# Patient Record
Sex: FEMALE | Race: OTHER | HISPANIC OR LATINO | URBAN - METROPOLITAN AREA
[De-identification: names, ages, dates, MRNs, and addresses within clinical notes are randomized per-mention and may not be internally consistent; named-entity substitution may affect disease eponyms.]

---

## 2017-01-26 ENCOUNTER — OUTPATIENT (OUTPATIENT)
Dept: OUTPATIENT SERVICES | Facility: HOSPITAL | Age: 22
LOS: 1 days | End: 2017-01-26
Payer: MEDICAID

## 2017-01-26 DIAGNOSIS — O47.02 FALSE LABOR BEFORE 37 COMPLETED WEEKS OF GESTATION, SECOND TRIMESTER: ICD-10-CM

## 2017-01-26 LAB
ALBUMIN SERPL ELPH-MCNC: 3.8 G/DL — SIGNIFICANT CHANGE UP (ref 3.3–5.2)
ALP SERPL-CCNC: 82 U/L — SIGNIFICANT CHANGE UP (ref 40–120)
ALT FLD-CCNC: 16 U/L — SIGNIFICANT CHANGE UP
ANION GAP SERPL CALC-SCNC: 15 MMOL/L — SIGNIFICANT CHANGE UP (ref 5–17)
ANISOCYTOSIS BLD QL: SLIGHT — SIGNIFICANT CHANGE UP
APPEARANCE UR: CLEAR — SIGNIFICANT CHANGE UP
AST SERPL-CCNC: 15 U/L — SIGNIFICANT CHANGE UP
BASOPHILS # BLD AUTO: 0 K/UL — SIGNIFICANT CHANGE UP (ref 0–0.2)
BILIRUB SERPL-MCNC: 0.1 MG/DL — LOW (ref 0.4–2)
BILIRUB UR-MCNC: NEGATIVE — SIGNIFICANT CHANGE UP
BUN SERPL-MCNC: 8 MG/DL — SIGNIFICANT CHANGE UP (ref 8–20)
CALCIUM SERPL-MCNC: 9.3 MG/DL — SIGNIFICANT CHANGE UP (ref 8.6–10.2)
CHLORIDE SERPL-SCNC: 99 MMOL/L — SIGNIFICANT CHANGE UP (ref 98–107)
CO2 SERPL-SCNC: 22 MMOL/L — SIGNIFICANT CHANGE UP (ref 22–29)
COLOR SPEC: YELLOW — SIGNIFICANT CHANGE UP
CREAT SERPL-MCNC: 0.42 MG/DL — LOW (ref 0.5–1.3)
DIFF PNL FLD: NEGATIVE — SIGNIFICANT CHANGE UP
EOSINOPHIL # BLD AUTO: 0.1 K/UL — SIGNIFICANT CHANGE UP (ref 0–0.5)
EOSINOPHIL NFR BLD AUTO: 1 % — SIGNIFICANT CHANGE UP (ref 0–5)
EPI CELLS # UR: SIGNIFICANT CHANGE UP
GLUCOSE SERPL-MCNC: 88 MG/DL — SIGNIFICANT CHANGE UP (ref 70–115)
GLUCOSE UR QL: NEGATIVE MG/DL — SIGNIFICANT CHANGE UP
HCT VFR BLD CALC: 29 % — LOW (ref 37–47)
HGB BLD-MCNC: 9.7 G/DL — LOW (ref 12–16)
HYPOCHROMIA BLD QL: SLIGHT — SIGNIFICANT CHANGE UP
KETONES UR-MCNC: NEGATIVE — SIGNIFICANT CHANGE UP
LEUKOCYTE ESTERASE UR-ACNC: NEGATIVE — SIGNIFICANT CHANGE UP
LYMPHOCYTES # BLD AUTO: 1.3 K/UL — SIGNIFICANT CHANGE UP (ref 1–4.8)
LYMPHOCYTES # BLD AUTO: 13 % — LOW (ref 20–55)
MCHC RBC-ENTMCNC: 28.4 PG — SIGNIFICANT CHANGE UP (ref 27–31)
MCHC RBC-ENTMCNC: 33.4 G/DL — SIGNIFICANT CHANGE UP (ref 32–36)
MCV RBC AUTO: 84.8 FL — SIGNIFICANT CHANGE UP (ref 81–99)
MONOCYTES # BLD AUTO: 0.8 K/UL — SIGNIFICANT CHANGE UP (ref 0–0.8)
MONOCYTES NFR BLD AUTO: 7 % — SIGNIFICANT CHANGE UP (ref 3–10)
MYELOCYTES NFR BLD: 1 % — HIGH (ref 0–0)
NEUTROPHILS # BLD AUTO: 7.2 K/UL — SIGNIFICANT CHANGE UP (ref 1.8–8)
NEUTROPHILS NFR BLD AUTO: 75 % — HIGH (ref 37–73)
NEUTS BAND # BLD: 3 % — SIGNIFICANT CHANGE UP (ref 0–8)
NITRITE UR-MCNC: NEGATIVE — SIGNIFICANT CHANGE UP
PH UR: 7 — SIGNIFICANT CHANGE UP (ref 4.8–8)
PLAT MORPH BLD: NORMAL — SIGNIFICANT CHANGE UP
PLATELET # BLD AUTO: 291 K/UL — SIGNIFICANT CHANGE UP (ref 150–400)
POIKILOCYTOSIS BLD QL AUTO: SLIGHT — SIGNIFICANT CHANGE UP
POTASSIUM SERPL-MCNC: 3.4 MMOL/L — LOW (ref 3.5–5.3)
POTASSIUM SERPL-SCNC: 3.4 MMOL/L — LOW (ref 3.5–5.3)
PROT SERPL-MCNC: 7.5 G/DL — SIGNIFICANT CHANGE UP (ref 6.6–8.7)
PROT UR-MCNC: NEGATIVE MG/DL — SIGNIFICANT CHANGE UP
RBC # BLD: 3.42 M/UL — LOW (ref 4.4–5.2)
RBC # FLD: 12.6 % — SIGNIFICANT CHANGE UP (ref 11–15.6)
RBC BLD AUTO: ABNORMAL
SODIUM SERPL-SCNC: 136 MMOL/L — SIGNIFICANT CHANGE UP (ref 135–145)
SP GR SPEC: 1.01 — SIGNIFICANT CHANGE UP (ref 1.01–1.02)
UROBILINOGEN FLD QL: NEGATIVE MG/DL — SIGNIFICANT CHANGE UP
WBC # BLD: 10.08 K/UL — SIGNIFICANT CHANGE UP (ref 4.8–10.8)
WBC # FLD AUTO: 10.08 K/UL — SIGNIFICANT CHANGE UP (ref 4.8–10.8)

## 2017-01-26 PROCEDURE — 85027 COMPLETE CBC AUTOMATED: CPT

## 2017-01-26 PROCEDURE — 59050 FETAL MONITOR W/REPORT: CPT

## 2017-01-26 PROCEDURE — 80053 COMPREHEN METABOLIC PANEL: CPT

## 2017-01-26 PROCEDURE — T1013: CPT

## 2017-01-26 PROCEDURE — 81001 URINALYSIS AUTO W/SCOPE: CPT

## 2017-01-26 RX ORDER — ASCORBIC ACID 60 MG
1 TABLET,CHEWABLE ORAL
Qty: 30 | Refills: 2 | OUTPATIENT
Start: 2017-01-26 | End: 2017-04-25

## 2017-01-26 RX ORDER — SODIUM CHLORIDE 9 MG/ML
1000 INJECTION, SOLUTION INTRAVENOUS ONCE
Qty: 0 | Refills: 0 | Status: DISCONTINUED | OUTPATIENT
Start: 2017-01-26 | End: 2017-02-10

## 2017-01-26 RX ORDER — FERROUS SULFATE 325(65) MG
1 TABLET ORAL
Qty: 60 | Refills: 2 | OUTPATIENT
Start: 2017-01-26 | End: 2017-04-25

## 2017-05-19 ENCOUNTER — OUTPATIENT (OUTPATIENT)
Dept: OUTPATIENT SERVICES | Facility: HOSPITAL | Age: 22
LOS: 1 days | End: 2017-05-19
Payer: COMMERCIAL

## 2017-05-19 DIAGNOSIS — O47.1 FALSE LABOR AT OR AFTER 37 COMPLETED WEEKS OF GESTATION: ICD-10-CM

## 2017-05-19 LAB
APPEARANCE UR: CLEAR — SIGNIFICANT CHANGE UP
BILIRUB UR-MCNC: NEGATIVE — SIGNIFICANT CHANGE UP
COLOR SPEC: YELLOW — SIGNIFICANT CHANGE UP
DIFF PNL FLD: ABNORMAL
EPI CELLS # UR: SIGNIFICANT CHANGE UP
GLUCOSE UR QL: NEGATIVE MG/DL — SIGNIFICANT CHANGE UP
KETONES UR-MCNC: NEGATIVE — SIGNIFICANT CHANGE UP
LEUKOCYTE ESTERASE UR-ACNC: ABNORMAL
NITRITE UR-MCNC: NEGATIVE — SIGNIFICANT CHANGE UP
PH UR: 7 — SIGNIFICANT CHANGE UP (ref 5–8)
PROT UR-MCNC: 100 MG/DL
RBC CASTS # UR COMP ASSIST: SIGNIFICANT CHANGE UP /HPF (ref 0–4)
SP GR SPEC: 1 — LOW (ref 1.01–1.02)
UROBILINOGEN FLD QL: NEGATIVE MG/DL — SIGNIFICANT CHANGE UP
WBC UR QL: ABNORMAL

## 2017-05-19 PROCEDURE — G0463: CPT

## 2017-05-19 PROCEDURE — 81001 URINALYSIS AUTO W/SCOPE: CPT

## 2017-05-19 PROCEDURE — 59025 FETAL NON-STRESS TEST: CPT

## 2017-05-19 PROCEDURE — 87086 URINE CULTURE/COLONY COUNT: CPT

## 2017-05-19 RX ORDER — CEPHALEXIN 500 MG
1 CAPSULE ORAL
Qty: 14 | Refills: 0 | OUTPATIENT
Start: 2017-05-19 | End: 2017-05-26

## 2017-05-20 LAB
CULTURE RESULTS: SIGNIFICANT CHANGE UP
SPECIMEN SOURCE: SIGNIFICANT CHANGE UP

## 2017-06-03 ENCOUNTER — INPATIENT (INPATIENT)
Facility: HOSPITAL | Age: 22
LOS: 2 days | Discharge: HOME | End: 2017-06-06
Attending: OBSTETRICS & GYNECOLOGY | Admitting: OBSTETRICS & GYNECOLOGY
Payer: MEDICAID

## 2017-06-03 VITALS — WEIGHT: 169.76 LBS | HEIGHT: 60 IN

## 2017-06-03 DIAGNOSIS — O47.1 FALSE LABOR AT OR AFTER 37 COMPLETED WEEKS OF GESTATION: ICD-10-CM

## 2017-06-03 DIAGNOSIS — O26.893 OTHER SPECIFIED PREGNANCY RELATED CONDITIONS, THIRD TRIMESTER: ICD-10-CM

## 2017-06-03 RX ORDER — SODIUM CHLORIDE 9 MG/ML
1000 INJECTION, SOLUTION INTRAVENOUS ONCE
Qty: 0 | Refills: 0 | Status: DISCONTINUED | OUTPATIENT
Start: 2017-06-03 | End: 2017-06-03

## 2017-06-03 RX ORDER — CITRIC ACID/SODIUM CITRATE 300-500 MG
30 SOLUTION, ORAL ORAL ONCE
Qty: 0 | Refills: 0 | Status: DISCONTINUED | OUTPATIENT
Start: 2017-06-03 | End: 2017-06-03

## 2017-06-03 RX ORDER — OXYTOCIN 10 UNIT/ML
333.33 VIAL (ML) INJECTION
Qty: 20 | Refills: 0 | Status: DISCONTINUED | OUTPATIENT
Start: 2017-06-03 | End: 2017-06-03

## 2017-06-03 RX ORDER — SODIUM CHLORIDE 9 MG/ML
1000 INJECTION, SOLUTION INTRAVENOUS
Qty: 0 | Refills: 0 | Status: DISCONTINUED | OUTPATIENT
Start: 2017-06-03 | End: 2017-06-03

## 2017-06-04 LAB
ANISOCYTOSIS BLD QL: SLIGHT — SIGNIFICANT CHANGE UP
ANISOCYTOSIS BLD QL: SLIGHT — SIGNIFICANT CHANGE UP
APPEARANCE UR: CLEAR — SIGNIFICANT CHANGE UP
BACTERIA # UR AUTO: ABNORMAL
BASE EXCESS BLDCOA CALC-SCNC: -6.6 MMOL/L — SIGNIFICANT CHANGE UP (ref -11.6–0.4)
BASE EXCESS BLDCOV CALC-SCNC: -6.1 MMOL/L — SIGNIFICANT CHANGE UP (ref -9.3–0.3)
BASOPHILS # BLD AUTO: 0 K/UL — SIGNIFICANT CHANGE UP (ref 0–0.2)
BASOPHILS NFR BLD AUTO: 0.2 % — SIGNIFICANT CHANGE UP (ref 0–2)
BASOPHILS NFR BLD AUTO: 2 % — SIGNIFICANT CHANGE UP (ref 0–2)
BILIRUB UR-MCNC: NEGATIVE — SIGNIFICANT CHANGE UP
BLD GP AB SCN SERPL QL: SIGNIFICANT CHANGE UP
COLOR SPEC: SIGNIFICANT CHANGE UP
DIFF PNL FLD: NEGATIVE — SIGNIFICANT CHANGE UP
EOSINOPHIL # BLD AUTO: 0 K/UL — SIGNIFICANT CHANGE UP (ref 0–0.5)
EOSINOPHIL NFR BLD AUTO: 0.1 % — SIGNIFICANT CHANGE UP (ref 0–6)
EOSINOPHIL NFR BLD AUTO: 3 % — SIGNIFICANT CHANGE UP (ref 0–5)
EPI CELLS # UR: ABNORMAL
GAS PNL BLDCOV: 7.23 — SIGNIFICANT CHANGE UP (ref 7.11–7.36)
GLUCOSE UR QL: NEGATIVE MG/DL — SIGNIFICANT CHANGE UP
HCO3 BLDCOA-SCNC: 22 MMOL/L — SIGNIFICANT CHANGE UP (ref 15–27)
HCO3 BLDCOV-SCNC: 22 MMOL/L — SIGNIFICANT CHANGE UP (ref 17–25)
HCT VFR BLD CALC: 22.2 % — LOW (ref 37–47)
HCT VFR BLD CALC: 26.5 % — LOW (ref 37–47)
HGB BLD-MCNC: 6.8 G/DL — CRITICAL LOW (ref 12–16)
HGB BLD-MCNC: 8.2 G/DL — LOW (ref 12–16)
HYPOCHROMIA BLD QL: SIGNIFICANT CHANGE UP
HYPOCHROMIA BLD QL: SIGNIFICANT CHANGE UP
KETONES UR-MCNC: NEGATIVE — SIGNIFICANT CHANGE UP
LEUKOCYTE ESTERASE UR-ACNC: ABNORMAL
LYMPHOCYTES # BLD AUTO: 1.4 K/UL — SIGNIFICANT CHANGE UP (ref 1–4.8)
LYMPHOCYTES # BLD AUTO: 12.4 % — LOW (ref 20–55)
LYMPHOCYTES # BLD AUTO: 30 % — SIGNIFICANT CHANGE UP (ref 20–55)
MCHC RBC-ENTMCNC: 21.4 PG — LOW (ref 27–31)
MCHC RBC-ENTMCNC: 22.6 PG — LOW (ref 27–31)
MCHC RBC-ENTMCNC: 30.6 G/DL — LOW (ref 32–36)
MCHC RBC-ENTMCNC: 30.9 G/DL — LOW (ref 32–36)
MCV RBC AUTO: 69.8 FL — LOW (ref 81–99)
MCV RBC AUTO: 73 FL — LOW (ref 81–99)
METAMYELOCYTES # FLD: 1 % — HIGH (ref 0–0)
MICROCYTES BLD QL: SLIGHT — SIGNIFICANT CHANGE UP
MONOCYTES # BLD AUTO: 0.8 K/UL — SIGNIFICANT CHANGE UP (ref 0–0.8)
MONOCYTES NFR BLD AUTO: 3 % — SIGNIFICANT CHANGE UP (ref 3–10)
MONOCYTES NFR BLD AUTO: 7.6 % — SIGNIFICANT CHANGE UP (ref 3–10)
NEUTROPHILS # BLD AUTO: 8.8 K/UL — HIGH (ref 1.8–8)
NEUTROPHILS NFR BLD AUTO: 61 % — SIGNIFICANT CHANGE UP (ref 37–73)
NEUTROPHILS NFR BLD AUTO: 79.3 % — HIGH (ref 37–73)
NITRITE UR-MCNC: NEGATIVE — SIGNIFICANT CHANGE UP
OVALOCYTES BLD QL SMEAR: SLIGHT — SIGNIFICANT CHANGE UP
OVALOCYTES BLD QL SMEAR: SLIGHT — SIGNIFICANT CHANGE UP
PCO2 BLDCOA: 52.8 MMHG — SIGNIFICANT CHANGE UP (ref 32.2–65.8)
PCO2 BLDCOV: 52 MMHG — HIGH (ref 27–49.4)
PH BLDCOA: 7.22 — SIGNIFICANT CHANGE UP (ref 7.11–7.36)
PH UR: 7 — SIGNIFICANT CHANGE UP (ref 5–8)
PLAT MORPH BLD: NORMAL — SIGNIFICANT CHANGE UP
PLAT MORPH BLD: NORMAL — SIGNIFICANT CHANGE UP
PLATELET # BLD AUTO: 254 K/UL — SIGNIFICANT CHANGE UP (ref 150–400)
PLATELET # BLD AUTO: 267 K/UL — SIGNIFICANT CHANGE UP (ref 150–400)
PO2 BLDCOA: 22.6 MMHG — SIGNIFICANT CHANGE UP (ref 6–30)
PO2 BLDCOA: 27.2 MMHG — SIGNIFICANT CHANGE UP (ref 17.4–41)
POIKILOCYTOSIS BLD QL AUTO: SLIGHT — SIGNIFICANT CHANGE UP
POIKILOCYTOSIS BLD QL AUTO: SLIGHT — SIGNIFICANT CHANGE UP
PROT UR-MCNC: NEGATIVE MG/DL — SIGNIFICANT CHANGE UP
RBC # BLD: 3.18 M/UL — LOW (ref 4.4–5.2)
RBC # BLD: 3.63 M/UL — LOW (ref 4.4–5.2)
RBC # FLD: 16.5 % — HIGH (ref 11–15.6)
RBC # FLD: 17.5 % — HIGH (ref 11–15.6)
RBC BLD AUTO: ABNORMAL
RBC BLD AUTO: ABNORMAL
RBC CASTS # UR COMP ASSIST: NEGATIVE /HPF — SIGNIFICANT CHANGE UP (ref 0–4)
RPR SERPL-ACNC: SIGNIFICANT CHANGE UP
SAO2 % BLDCOA: SIGNIFICANT CHANGE UP
SAO2 % BLDCOV: SIGNIFICANT CHANGE UP
SP GR SPEC: 1 — LOW (ref 1.01–1.02)
STOMATOCYTES BLD QL SMEAR: PRESENT — SIGNIFICANT CHANGE UP
TARGETS BLD QL SMEAR: SLIGHT — SIGNIFICANT CHANGE UP
TYPE + AB SCN PNL BLD: SIGNIFICANT CHANGE UP
UROBILINOGEN FLD QL: NEGATIVE MG/DL — SIGNIFICANT CHANGE UP
WBC # BLD: 11.1 K/UL — HIGH (ref 4.8–10.8)
WBC # BLD: 7.1 K/UL — SIGNIFICANT CHANGE UP (ref 4.8–10.8)
WBC # FLD AUTO: 11.1 K/UL — HIGH (ref 4.8–10.8)
WBC # FLD AUTO: 7.1 K/UL — SIGNIFICANT CHANGE UP (ref 4.8–10.8)
WBC UR QL: SIGNIFICANT CHANGE UP

## 2017-06-04 RX ORDER — SODIUM CHLORIDE 9 MG/ML
1000 INJECTION, SOLUTION INTRAVENOUS ONCE
Qty: 0 | Refills: 0 | Status: COMPLETED | OUTPATIENT
Start: 2017-06-04 | End: 2017-06-04

## 2017-06-04 RX ORDER — ACETAMINOPHEN 500 MG
650 TABLET ORAL EVERY 6 HOURS
Qty: 0 | Refills: 0 | Status: DISCONTINUED | OUTPATIENT
Start: 2017-06-04 | End: 2017-06-06

## 2017-06-04 RX ORDER — SODIUM CHLORIDE 9 MG/ML
1000 INJECTION, SOLUTION INTRAVENOUS
Qty: 0 | Refills: 0 | Status: DISCONTINUED | OUTPATIENT
Start: 2017-06-04 | End: 2017-06-04

## 2017-06-04 RX ORDER — IBUPROFEN 200 MG
600 TABLET ORAL EVERY 6 HOURS
Qty: 0 | Refills: 0 | Status: DISCONTINUED | OUTPATIENT
Start: 2017-06-04 | End: 2017-06-06

## 2017-06-04 RX ORDER — SODIUM CHLORIDE 9 MG/ML
3 INJECTION INTRAMUSCULAR; INTRAVENOUS; SUBCUTANEOUS EVERY 8 HOURS
Qty: 0 | Refills: 0 | Status: DISCONTINUED | OUTPATIENT
Start: 2017-06-04 | End: 2017-06-06

## 2017-06-04 RX ORDER — TRANEXAMIC ACID 100 MG/ML
1000 INJECTION, SOLUTION INTRAVENOUS ONCE
Qty: 0 | Refills: 0 | Status: DISCONTINUED | OUTPATIENT
Start: 2017-06-04 | End: 2017-06-06

## 2017-06-04 RX ORDER — OXYTOCIN 10 UNIT/ML
333.33 VIAL (ML) INJECTION
Qty: 20 | Refills: 0 | Status: DISCONTINUED | OUTPATIENT
Start: 2017-06-04 | End: 2017-06-04

## 2017-06-04 RX ORDER — GLYCERIN ADULT
1 SUPPOSITORY, RECTAL RECTAL AT BEDTIME
Qty: 0 | Refills: 0 | Status: DISCONTINUED | OUTPATIENT
Start: 2017-06-04 | End: 2017-06-06

## 2017-06-04 RX ORDER — DIBUCAINE 1 %
1 OINTMENT (GRAM) RECTAL EVERY 4 HOURS
Qty: 0 | Refills: 0 | Status: DISCONTINUED | OUTPATIENT
Start: 2017-06-04 | End: 2017-06-06

## 2017-06-04 RX ORDER — HYDROCORTISONE 1 %
1 OINTMENT (GRAM) TOPICAL EVERY 4 HOURS
Qty: 0 | Refills: 0 | Status: DISCONTINUED | OUTPATIENT
Start: 2017-06-04 | End: 2017-06-06

## 2017-06-04 RX ORDER — SODIUM CHLORIDE 9 MG/ML
1000 INJECTION, SOLUTION INTRAVENOUS ONCE
Qty: 0 | Refills: 0 | Status: DISCONTINUED | OUTPATIENT
Start: 2017-06-04 | End: 2017-06-04

## 2017-06-04 RX ORDER — CITRIC ACID/SODIUM CITRATE 300-500 MG
30 SOLUTION, ORAL ORAL ONCE
Qty: 0 | Refills: 0 | Status: DISCONTINUED | OUTPATIENT
Start: 2017-06-04 | End: 2017-06-04

## 2017-06-04 RX ORDER — LANOLIN
1 OINTMENT (GRAM) TOPICAL EVERY 6 HOURS
Qty: 0 | Refills: 0 | Status: DISCONTINUED | OUTPATIENT
Start: 2017-06-04 | End: 2017-06-06

## 2017-06-04 RX ORDER — AER TRAVELER 0.5 G/1
1 SOLUTION RECTAL; TOPICAL EVERY 4 HOURS
Qty: 0 | Refills: 0 | Status: DISCONTINUED | OUTPATIENT
Start: 2017-06-04 | End: 2017-06-06

## 2017-06-04 RX ORDER — CITRIC ACID/SODIUM CITRATE 300-500 MG
30 SOLUTION, ORAL ORAL ONCE
Qty: 0 | Refills: 0 | Status: COMPLETED | OUTPATIENT
Start: 2017-06-04 | End: 2017-06-04

## 2017-06-04 RX ORDER — OXYTOCIN 10 UNIT/ML
41.67 VIAL (ML) INJECTION
Qty: 20 | Refills: 0 | Status: DISCONTINUED | OUTPATIENT
Start: 2017-06-04 | End: 2017-06-06

## 2017-06-04 RX ORDER — PRAMOXINE HYDROCHLORIDE 150 MG/15G
1 AEROSOL, FOAM RECTAL EVERY 4 HOURS
Qty: 0 | Refills: 0 | Status: DISCONTINUED | OUTPATIENT
Start: 2017-06-04 | End: 2017-06-06

## 2017-06-04 RX ORDER — SIMETHICONE 80 MG/1
80 TABLET, CHEWABLE ORAL EVERY 6 HOURS
Qty: 0 | Refills: 0 | Status: DISCONTINUED | OUTPATIENT
Start: 2017-06-04 | End: 2017-06-06

## 2017-06-04 RX ORDER — MAGNESIUM HYDROXIDE 400 MG/1
30 TABLET, CHEWABLE ORAL
Qty: 0 | Refills: 0 | Status: DISCONTINUED | OUTPATIENT
Start: 2017-06-04 | End: 2017-06-06

## 2017-06-04 RX ORDER — TETANUS TOXOID, REDUCED DIPHTHERIA TOXOID AND ACELLULAR PERTUSSIS VACCINE, ADSORBED 5; 2.5; 8; 8; 2.5 [IU]/.5ML; [IU]/.5ML; UG/.5ML; UG/.5ML; UG/.5ML
0.5 SUSPENSION INTRAMUSCULAR ONCE
Qty: 0 | Refills: 0 | Status: DISCONTINUED | OUTPATIENT
Start: 2017-06-04 | End: 2017-06-06

## 2017-06-04 RX ORDER — DIPHENHYDRAMINE HCL 50 MG
25 CAPSULE ORAL EVERY 6 HOURS
Qty: 0 | Refills: 0 | Status: DISCONTINUED | OUTPATIENT
Start: 2017-06-04 | End: 2017-06-06

## 2017-06-04 RX ORDER — DOCUSATE SODIUM 100 MG
100 CAPSULE ORAL
Qty: 0 | Refills: 0 | Status: DISCONTINUED | OUTPATIENT
Start: 2017-06-04 | End: 2017-06-06

## 2017-06-04 RX ADMIN — Medication 30 MILLILITER(S): at 07:00

## 2017-06-04 RX ADMIN — SODIUM CHLORIDE 3 MILLILITER(S): 9 INJECTION INTRAMUSCULAR; INTRAVENOUS; SUBCUTANEOUS at 21:47

## 2017-06-04 RX ADMIN — SODIUM CHLORIDE 125 MILLILITER(S): 9 INJECTION, SOLUTION INTRAVENOUS at 08:09

## 2017-06-04 RX ADMIN — Medication 600 MILLIGRAM(S): at 21:48

## 2017-06-04 RX ADMIN — SODIUM CHLORIDE 2000 MILLILITER(S): 9 INJECTION, SOLUTION INTRAVENOUS at 06:55

## 2017-06-04 RX ADMIN — Medication 600 MILLIGRAM(S): at 22:30

## 2017-06-04 RX ADMIN — SODIUM CHLORIDE 125 MILLILITER(S): 9 INJECTION, SOLUTION INTRAVENOUS at 02:33

## 2017-06-04 RX ADMIN — SODIUM CHLORIDE 125 MILLILITER(S): 9 INJECTION, SOLUTION INTRAVENOUS at 00:20

## 2017-06-05 DIAGNOSIS — O99.019 ANEMIA COMPLICATING PREGNANCY, UNSPECIFIED TRIMESTER: ICD-10-CM

## 2017-06-05 RX ADMIN — Medication 600 MILLIGRAM(S): at 23:45

## 2017-06-05 RX ADMIN — Medication 1 TABLET(S): at 10:41

## 2017-06-05 RX ADMIN — Medication 600 MILLIGRAM(S): at 22:47

## 2017-06-05 RX ADMIN — SODIUM CHLORIDE 3 MILLILITER(S): 9 INJECTION INTRAMUSCULAR; INTRAVENOUS; SUBCUTANEOUS at 07:05

## 2017-06-05 NOTE — PROGRESS NOTE ADULT - SUBJECTIVE AND OBJECTIVE BOX
22 yo  s/p  PPD#1. Pt seen and examined at bedside. Patient is ambulating, tolerating PO, denies n/v, dysuria, passing flatus, - BM, pain is well controlled on medication.     Vital Signs Last 24 Hrs  T(C): 37.1, Max: 37.2 (06-04 @ 13:30)  T(F): 98.7, Max: 98.9 (06- @ 13:30)  HR: 89 (77 - 93)  BP: 100/66 (100/66 - 138/61)  BP(mean): --  RR: 18 (16 - 18)  SpO2: 100% (99% - 100%) RA    General: NAD  HEENT: NCAT  Respiratory: CTABL  CV: S1S2 with RRR  Abdominal: Soft, +BS x 4 quadrants, uterine fundus firm level of umbilicus   Pelvic:  minimal lochia  Extremities: No edema b/l; no calf tenderness                              8.2    11.1  )-----------( 267      ( 2017 17:46 )             26.5   MEDICATIONS  (STANDING):  misoprostol Oral Solution 60MICROGram(s) Oral every 2 hours  misoprostol Oral Solution 40MICROGram(s) Oral every 2 hours  tranexamic acid IVPB 1000milliGRAM(s) IV Intermittent once  sodium chloride 0.9% lock flush 3milliLiter(s) IV Push every 8 hours  diphtheria/tetanus/pertussis (acellular) Vaccine (ADAcel) 0.5milliLiter(s) IntraMuscular once  oxytocin Infusion 41.667milliUNIT(s)/Min IV Continuous <Continuous>  prenatal multivitamin 1Tablet(s) Oral daily    MEDICATIONS  (PRN):  acetaminophen   Tablet. 650milliGRAM(s) Oral every 6 hours PRN Mild Pain  acetaminophen   Tablet 650milliGRAM(s) Oral every 6 hours PRN For Temp greater than 38.5 C (101.3 F)  ibuprofen  Tablet 600milliGRAM(s) Oral every 6 hours PRN Moderate Pain  hydrocortisone 1% Cream 1Application(s) Topical every 4 hours PRN Moderate to Severe Perineal Pain  pramoxine 1%/zinc 5% Cream 1Application(s) Topical every 4 hours PRN Moderate to Severe Perineal Pain  dibucaine 1% Ointment 1Application(s) Topical every 4 hours PRN Perineal Discomfort  lanolin Ointment 1Application(s) Topical every 6 hours PRN Sore Nipples  witch hazel Pads 1Application(s) Topical every 4 hours PRN Perineal Discomfort  simethicone 80milliGRAM(s) Chew every 6 hours PRN Gas  diphenhydrAMINE   Capsule 25milliGRAM(s) Oral every 6 hours PRN Itching  glycerin Suppository - Adult 1Suppository(s) Rectal at bedtime PRN Constipation  magnesium hydroxide Suspension 30milliLiter(s) Oral two times a day PRN Constipation  docusate sodium 100milliGRAM(s) Oral two times a day PRN Stool Softening 22 yo  s/p  PPD#1. Pt seen and examined at bedside. Patient is ambulating, tolerating PO, denies n/v, dysuria, passing flatus, - BM, pain is well controlled on medication. Pt is bottle-feeding.     Vital Signs Last 24 Hrs  T(C): 37.1, Max: 37.2 (06-04 @ 13:30)  T(F): 98.7, Max: 98.9 (06-04 @ 13:30)  HR: 89 (77 - 93)  BP: 100/66 (100/66 - 138/61)  BP(mean): --  RR: 18 (16 - 18)  SpO2: 100% (99% - 100%) RA    General: NAD  HEENT: NCAT  Respiratory: CTABL  CV: S1S2 with RRR  Abdominal: Soft, +BS x 4 quadrants, uterine fundus firm level of umbilicus   Pelvic:  minimal lochia  Extremities: No edema b/l; no calf tenderness                              8.2    11.1  )-----------( 267      ( 2017 17:46 )             26.5   MEDICATIONS  (STANDING):  misoprostol Oral Solution 60MICROGram(s) Oral every 2 hours  misoprostol Oral Solution 40MICROGram(s) Oral every 2 hours  tranexamic acid IVPB 1000milliGRAM(s) IV Intermittent once  sodium chloride 0.9% lock flush 3milliLiter(s) IV Push every 8 hours  diphtheria/tetanus/pertussis (acellular) Vaccine (ADAcel) 0.5milliLiter(s) IntraMuscular once  oxytocin Infusion 41.667milliUNIT(s)/Min IV Continuous <Continuous>  prenatal multivitamin 1Tablet(s) Oral daily    MEDICATIONS  (PRN):  acetaminophen   Tablet. 650milliGRAM(s) Oral every 6 hours PRN Mild Pain  acetaminophen   Tablet 650milliGRAM(s) Oral every 6 hours PRN For Temp greater than 38.5 C (101.3 F)  ibuprofen  Tablet 600milliGRAM(s) Oral every 6 hours PRN Moderate Pain  hydrocortisone 1% Cream 1Application(s) Topical every 4 hours PRN Moderate to Severe Perineal Pain  pramoxine 1%/zinc 5% Cream 1Application(s) Topical every 4 hours PRN Moderate to Severe Perineal Pain  dibucaine 1% Ointment 1Application(s) Topical every 4 hours PRN Perineal Discomfort  lanolin Ointment 1Application(s) Topical every 6 hours PRN Sore Nipples  witch hazel Pads 1Application(s) Topical every 4 hours PRN Perineal Discomfort  simethicone 80milliGRAM(s) Chew every 6 hours PRN Gas  diphenhydrAMINE   Capsule 25milliGRAM(s) Oral every 6 hours PRN Itching  glycerin Suppository - Adult 1Suppository(s) Rectal at bedtime PRN Constipation  magnesium hydroxide Suspension 30milliLiter(s) Oral two times a day PRN Constipation  docusate sodium 100milliGRAM(s) Oral two times a day PRN Stool Softening 20 yo  s/p  @ 39 .0  PPD#1. Pt seen and examined at bedside. Patient is ambulating, tolerating PO, denies n/v, dysuria, passing flatus, - BM, pain is well controlled on medication. Pt is bottle-feeding.     Vital Signs Last 24 Hrs  T(C): 37.1, Max: 37.2 (06-04 @ 13:30)  T(F): 98.7, Max: 98.9 (06-04 @ 13:30)  HR: 89 (77 - 93)  BP: 100/66 (100/66 - 138/61)  BP(mean): --  RR: 18 (16 - 18)  SpO2: 100% (99% - 100%) RA    General: NAD  HEENT: NCAT  Respiratory: CTABL  CV: S1S2 with RRR  Abdominal: Soft, +BS x 4 quadrants, uterine fundus firm level of umbilicus   Pelvic:  minimal lochia  Extremities: No edema b/l; no calf tenderness                              8.2    11.1  )-----------( 267      ( 2017 17:46 )             26.5   MEDICATIONS  (STANDING):  misoprostol Oral Solution 60MICROGram(s) Oral every 2 hours  misoprostol Oral Solution 40MICROGram(s) Oral every 2 hours  tranexamic acid IVPB 1000milliGRAM(s) IV Intermittent once  sodium chloride 0.9% lock flush 3milliLiter(s) IV Push every 8 hours  diphtheria/tetanus/pertussis (acellular) Vaccine (ADAcel) 0.5milliLiter(s) IntraMuscular once  oxytocin Infusion 41.667milliUNIT(s)/Min IV Continuous <Continuous>  prenatal multivitamin 1Tablet(s) Oral daily    MEDICATIONS  (PRN):  acetaminophen   Tablet. 650milliGRAM(s) Oral every 6 hours PRN Mild Pain  acetaminophen   Tablet 650milliGRAM(s) Oral every 6 hours PRN For Temp greater than 38.5 C (101.3 F)  ibuprofen  Tablet 600milliGRAM(s) Oral every 6 hours PRN Moderate Pain  hydrocortisone 1% Cream 1Application(s) Topical every 4 hours PRN Moderate to Severe Perineal Pain  pramoxine 1%/zinc 5% Cream 1Application(s) Topical every 4 hours PRN Moderate to Severe Perineal Pain  dibucaine 1% Ointment 1Application(s) Topical every 4 hours PRN Perineal Discomfort  lanolin Ointment 1Application(s) Topical every 6 hours PRN Sore Nipples  witch hazel Pads 1Application(s) Topical every 4 hours PRN Perineal Discomfort  simethicone 80milliGRAM(s) Chew every 6 hours PRN Gas  diphenhydrAMINE   Capsule 25milliGRAM(s) Oral every 6 hours PRN Itching  glycerin Suppository - Adult 1Suppository(s) Rectal at bedtime PRN Constipation  magnesium hydroxide Suspension 30milliLiter(s) Oral two times a day PRN Constipation  docusate sodium 100milliGRAM(s) Oral two times a day PRN Stool Softening 20 yo  s/p  @ 39 .0  PPD#1. Pt seen and examined at bedside. Patient is ambulating, tolerating PO, passing flatus, - BM, pain is well controlled on medication. Pt is bottle-feeding.     Vital Signs Last 24 Hrs  T(C): 37.1, Max: 37.2 (06-04 @ 13:30)  T(F): 98.7, Max: 98.9 (06-04 @ 13:30)  HR: 89 (77 - 93)  BP: 100/66 (100/66 - 138/61)  BP(mean): --  RR: 18 (16 - 18)  SpO2: 100% (99% - 100%) RA    General: NAD  HEENT: NCAT  Respiratory: CTABL  CV: S1S2 with RRR  Abdominal: Soft, +BS x 4 quadrants, uterine fundus firm level of umbilicus   Pelvic:  minimal lochia  Extremities: No edema b/l; no calf tenderness                              8.2    11.1  )-----------( 267      ( 2017 17:46 )             26.5   MEDICATIONS  (STANDING):  misoprostol Oral Solution 60MICROGram(s) Oral every 2 hours  misoprostol Oral Solution 40MICROGram(s) Oral every 2 hours  tranexamic acid IVPB 1000milliGRAM(s) IV Intermittent once  sodium chloride 0.9% lock flush 3milliLiter(s) IV Push every 8 hours  diphtheria/tetanus/pertussis (acellular) Vaccine (ADAcel) 0.5milliLiter(s) IntraMuscular once  oxytocin Infusion 41.667milliUNIT(s)/Min IV Continuous <Continuous>  prenatal multivitamin 1Tablet(s) Oral daily    MEDICATIONS  (PRN):  acetaminophen   Tablet. 650milliGRAM(s) Oral every 6 hours PRN Mild Pain  acetaminophen   Tablet 650milliGRAM(s) Oral every 6 hours PRN For Temp greater than 38.5 C (101.3 F)  ibuprofen  Tablet 600milliGRAM(s) Oral every 6 hours PRN Moderate Pain  hydrocortisone 1% Cream 1Application(s) Topical every 4 hours PRN Moderate to Severe Perineal Pain  pramoxine 1%/zinc 5% Cream 1Application(s) Topical every 4 hours PRN Moderate to Severe Perineal Pain  dibucaine 1% Ointment 1Application(s) Topical every 4 hours PRN Perineal Discomfort  lanolin Ointment 1Application(s) Topical every 6 hours PRN Sore Nipples  witch hazel Pads 1Application(s) Topical every 4 hours PRN Perineal Discomfort  simethicone 80milliGRAM(s) Chew every 6 hours PRN Gas  diphenhydrAMINE   Capsule 25milliGRAM(s) Oral every 6 hours PRN Itching  glycerin Suppository - Adult 1Suppository(s) Rectal at bedtime PRN Constipation  magnesium hydroxide Suspension 30milliLiter(s) Oral two times a day PRN Constipation  docusate sodium 100milliGRAM(s) Oral two times a day PRN Stool Softening

## 2017-06-05 NOTE — PROGRESS NOTE ADULT - SUBJECTIVE AND OBJECTIVE BOX
INTERVAL HPI/OVERNIGHT EVENTS:  21y Female s/p labor epidural on 6/4/17    Vital Signs Last 24 Hrs  T(C): 36.8, Max: 37.1 (06-04 @ 19:30)  T(F): 98.3, Max: 98.7 (06-04 @ 19:30)  HR: 70 (70 - 89)  BP: 101/66 (100/66 - 101/66)  BP(mean): --  RR: 18 (18 - 18)  SpO2: --    Patient seen, doing well, no headache, no residual numbness or weakness, no anesthetic complications or complaints noted or reported.

## 2017-06-06 VITALS
TEMPERATURE: 98 F | SYSTOLIC BLOOD PRESSURE: 117 MMHG | HEART RATE: 60 BPM | DIASTOLIC BLOOD PRESSURE: 78 MMHG | RESPIRATION RATE: 18 BRPM

## 2017-06-06 PROCEDURE — P9016: CPT

## 2017-06-06 PROCEDURE — 85027 COMPLETE CBC AUTOMATED: CPT

## 2017-06-06 PROCEDURE — 86900 BLOOD TYPING SEROLOGIC ABO: CPT

## 2017-06-06 PROCEDURE — 86901 BLOOD TYPING SEROLOGIC RH(D): CPT

## 2017-06-06 PROCEDURE — 86850 RBC ANTIBODY SCREEN: CPT

## 2017-06-06 PROCEDURE — 59025 FETAL NON-STRESS TEST: CPT

## 2017-06-06 PROCEDURE — T1013: CPT

## 2017-06-06 PROCEDURE — 36415 COLL VENOUS BLD VENIPUNCTURE: CPT

## 2017-06-06 PROCEDURE — 36430 TRANSFUSION BLD/BLD COMPNT: CPT

## 2017-06-06 PROCEDURE — 82803 BLOOD GASES ANY COMBINATION: CPT

## 2017-06-06 PROCEDURE — 83986 ASSAY PH BODY FLUID NOS: CPT

## 2017-06-06 PROCEDURE — 86920 COMPATIBILITY TEST SPIN: CPT

## 2017-06-06 PROCEDURE — 81001 URINALYSIS AUTO W/SCOPE: CPT

## 2017-06-06 PROCEDURE — 86592 SYPHILIS TEST NON-TREP QUAL: CPT

## 2017-06-06 RX ORDER — IBUPROFEN 200 MG
1 TABLET ORAL
Qty: 20 | Refills: 0 | OUTPATIENT
Start: 2017-06-06 | End: 2017-06-11

## 2017-06-06 RX ADMIN — Medication 1 TABLET(S): at 11:32

## 2017-06-06 NOTE — DISCHARGE NOTE OB - MEDICATION SUMMARY - MEDICATIONS TO STOP TAKING
I will STOP taking the medications listed below when I get home from the hospital:    cephalexin 500 mg oral tablet  -- 1 tab(s) by mouth every 12 hours  -- Finish all this medication unless otherwise directed by prescriber.

## 2017-06-06 NOTE — DISCHARGE NOTE OB - PATIENT PORTAL LINK FT
“You can access the [facility] Patient Portal, offered by [facility], by registering with the following website: [email or web address]”

## 2017-06-06 NOTE — DISCHARGE NOTE OB - PLAN OF CARE
follow up with [facility] center in 6 week for post partum care Low fat followup in clinic in 3-5 days after discharge home. enfamil 2-3oz every 3-4hours. breastfeeding PRN.

## 2017-06-06 NOTE — PROGRESS NOTE ADULT - ATTENDING COMMENTS
PPD#2  meeting all milestones  discharge home today
patient status post   patient with severe anemia upon admission  patient status post 1 unit of PRBC's and hgb now 8  will follow up cbc this am

## 2017-06-06 NOTE — DISCHARGE NOTE OB - PROVIDER TOKENS
FREE:[LAST:[Yuan],FIRST:[Mnaolo PARR)],PHONE:[(   )    -],FAX:[(   )    -],ADDRESS:[[place] [address]  [phone]]]

## 2017-06-06 NOTE — DISCHARGE NOTE OB - HOSPITAL COURSE
22yo now  came in with CC of labor.  There were no complications during the pregnancy   Pt had  on 2017 by Dr. Del Valle with no complications.     Pt was transferred to [facility].   While on the floor patient ate, urinated WNL, ambulated, and pain was well managed.     Pt to be discharged home.   Followup in [facility] in 6 weeks for postpartum check.    D/C home on  PNV, motrin, iron and vitamin C.

## 2017-06-06 NOTE — DISCHARGE NOTE OB - MEDICATION SUMMARY - MEDICATIONS TO TAKE
I will START or STAY ON the medications listed below when I get home from the hospital:    ibuprofen 600 mg oral tablet  -- 1 tab(s) by mouth every 6 hours, As needed, Moderate Pain  -- Indication: For pain    Prenatal Multivitamins with Folic Acid 0.2 mg oral tablet  -- 1 tab(s) by mouth once a day  -- Indication: For supplement    ferrous sulfate 325 mg (65 mg elemental iron) oral tablet  -- 1 tab(s) by mouth 2 times a day  -- Check with your doctor before becoming pregnant.  Do not chew, break, or crush.  May discolor urine or feces.    -- Indication: For Anemia affecting second pregnancy    ascorbic acid 500 mg oral tablet  -- 1 tab(s) by mouth once a day take with iron tab  -- Indication: For supplement

## 2017-06-06 NOTE — DISCHARGE NOTE OB - MATERIALS PROVIDED
Breastfeeding Guide and Packet/Breastfeeding Log/Birth Certificate Instructions/Shaken Baby Prevention Handout/Bottle Feeding Log/Discharge Medication Information for Patients and Families Pocket Guide/[place]  Immunization Record/Guide to Postpartum Care/Vaccinations/Back To Sleep Handout/[facility] Hearing Screen Program/[facility]  Screening Program

## 2017-06-06 NOTE — PROGRESS NOTE ADULT - PROBLEM SELECTOR PLAN 1
Encourage ambulation  and hydration  Encourage breastfeeding and mother/baby interaction  Plan for d/c PPD #2 with follow up at [facility]
Encourage ambulation  and hydration  Encourage breastfeeding and mother/baby interaction  Plan for d/c PPD #2 with follow up at [facility]

## 2017-06-06 NOTE — DISCHARGE NOTE OB - CARE PROVIDER_API CALL
Manolo Bolden)  [place] [id number] [facility] [id number] [phone]  Phone: (   )    -  Fax: (   )    -

## 2017-06-06 NOTE — PROGRESS NOTE ADULT - PROBLEM SELECTOR PLAN 2
patient status post 1 unit of PRBC's postpartum  patient to continue iron supplementation  H/H is 8.2 and 26
patient status post 1 unit of PRBC's postpartum  patient to continue iron supplementation

## 2017-06-06 NOTE — DISCHARGE NOTE OB - CARE PLAN
Principal Discharge DX:	 (normal spontaneous vaginal delivery)  Goal:	follow up with [facility] center in 6 week for post partum care  Instructions for follow-up, activity and diet:	Low fat followup in clinic in 3-5 days after discharge home. enfamil 2-3oz every 3-4hours. breastfeeding PRN. Principal Discharge DX:	 (normal spontaneous vaginal delivery)  Goal:	follow up with [facility] center in 6 week for post partum care  Instructions for follow-up, activity and diet:	Low fat followup in clinic in 3-5 days after discharge home. enfamil 2-3oz every 3-4hours. breastfeeding PRN.

## 2017-06-06 NOTE — PROGRESS NOTE ADULT - SUBJECTIVE AND OBJECTIVE BOX
PgY-2 Note    20 yo  s/p  @ 39 .0  PPD#2. Pt seen and examined at bedside. Patient is ambulating, tolerating PO, passing flatus, +BM, pain is well controlled on medication. Pt is bottle-feeding.     Vital Signs Last 24 Hrs  T(C): 37.6, Max: 37.6 (06-05 @ 20:32)  T(F): 99.6, Max: 99.6 (06-05 @ 20:32)  HR: 80 (70 - 83)  BP: 120/78 (101/66 - 120/78)  RR: 18 (18 - 18)  SpO2: -- 100%    General: NAD  HEENT: NCAT  Respiratory: CTABL  CV: S1S2 with RRR  Abdominal: Soft, +BS x 4 quadrants, uterine fundus firm 2cm below level of umbilicus   Pelvic:  minimal lochia  Extremities: No edema b/l; no calf tenderness                              8.2    11.1  )-----------( 267      ( 2017 17:46 )             26.5     MEDICATIONS  (STANDING):  misoprostol Oral Solution 60MICROGram(s) Oral every 2 hours  misoprostol Oral Solution 40MICROGram(s) Oral every 2 hours  tranexamic acid IVPB 1000milliGRAM(s) IV Intermittent once  sodium chloride 0.9% lock flush 3milliLiter(s) IV Push every 8 hours  diphtheria/tetanus/pertussis (acellular) Vaccine (ADAcel) 0.5milliLiter(s) IntraMuscular once  oxytocin Infusion 41.667milliUNIT(s)/Min IV Continuous <Continuous>  prenatal multivitamin 1Tablet(s) Oral daily    MEDICATIONS  (PRN):  acetaminophen   Tablet. 650milliGRAM(s) Oral every 6 hours PRN Mild Pain  acetaminophen   Tablet 650milliGRAM(s) Oral every 6 hours PRN For Temp greater than 38.5 C (101.3 F)  ibuprofen  Tablet 600milliGRAM(s) Oral every 6 hours PRN Moderate Pain  hydrocortisone 1% Cream 1Application(s) Topical every 4 hours PRN Moderate to Severe Perineal Pain  pramoxine 1%/zinc 5% Cream 1Application(s) Topical every 4 hours PRN Moderate to Severe Perineal Pain  dibucaine 1% Ointment 1Application(s) Topical every 4 hours PRN Perineal Discomfort  lanolin Ointment 1Application(s) Topical every 6 hours PRN Sore Nipples  witch hazel Pads 1Application(s) Topical every 4 hours PRN Perineal Discomfort  simethicone 80milliGRAM(s) Chew every 6 hours PRN Gas  diphenhydrAMINE   Capsule 25milliGRAM(s) Oral every 6 hours PRN Itching  glycerin Suppository - Adult 1Suppository(s) Rectal at bedtime PRN Constipation  magnesium hydroxide Suspension 30milliLiter(s) Oral two times a day PRN Constipation  docusate sodium 100milliGRAM(s) Oral two times a day PRN Stool Softening

## 2018-03-20 NOTE — PATIENT PROFILE OB - WEIGHT : PRESENT IN LBS
Happy Hour Pal. [address] [place] [address], [place] [address]. StuffBuff. [address] [place] [address], [place] [address]. [employer]. [address] [place] [address], [place] [address]. FastSpring. [address] [place] [address], [place] [address]. [phone]   [facility]pm Monday-Friday Saturday-Sunday- 9am-5pm   [place] - 5pm-9pm Monday-Friday Saturday-Sunday- 9am-5pm  [phone] - [place]   [phone] - [place]       What options do I have for visits at the clinic other than the traditional office visit?  To expand how we care for you, many of our providers are utilizing electronic visits (e-visits) and telephone visits, when medically appropriate, for interactions with their patients rather than a visit in the clinic.   We also offer nurse visits for many medical concerns. Just like any other service, we will bill your insurance company for this type of visit based on time spent on the phone with your provider. Not all insurance companies cover these visits. Please check with your medical insurance if this type of visit is covered. You will be responsible for any charges that are not paid by your insurance.      E-visits via [employer]:  generally incur a $35.00 fee.  Telephone visits:  Time spent on the phone: *charged based on time that is spent on the phone in increments of 10  minutes. Estimated cost:   5-10 mins $30.00   11-20 mins. $59.00   21-30 mins. $85.00     Use [employer] (secure email communication and access to your chart) to send your primary care provider a message or make an appointment. Ask someone on your Team how to sign up for [email or web address].  For a Price Quote for your services, please call our [employer] Price Line at [phone].  As always, Thank you for trusting us with your health care needs!    April Weaver MA     170

## 2018-04-30 ENCOUNTER — INPATIENT (INPATIENT)
Facility: HOSPITAL | Age: 23
LOS: 3 days | Discharge: HOME | DRG: 871 | End: 2018-05-04
Attending: INTERNAL MEDICINE | Admitting: INTERNAL MEDICINE
Payer: MEDICAID

## 2018-04-30 VITALS
OXYGEN SATURATION: 99 % | HEART RATE: 126 BPM | WEIGHT: 125 LBS | SYSTOLIC BLOOD PRESSURE: 98 MMHG | RESPIRATION RATE: 22 BRPM | DIASTOLIC BLOOD PRESSURE: 69 MMHG | TEMPERATURE: 102 F | HEIGHT: 65 IN

## 2018-04-30 DIAGNOSIS — A41.9 SEPSIS, UNSPECIFIED ORGANISM: ICD-10-CM

## 2018-04-30 LAB
ALBUMIN SERPL ELPH-MCNC: 3.5 G/DL — SIGNIFICANT CHANGE UP (ref 3.3–5.2)
ALBUMIN SERPL ELPH-MCNC: 4.2 G/DL — SIGNIFICANT CHANGE UP (ref 3.3–5.2)
ALP SERPL-CCNC: 80 U/L — SIGNIFICANT CHANGE UP (ref 40–120)
ALP SERPL-CCNC: 84 U/L — SIGNIFICANT CHANGE UP (ref 40–120)
ALT FLD-CCNC: 12 U/L — SIGNIFICANT CHANGE UP
ALT FLD-CCNC: 15 U/L — SIGNIFICANT CHANGE UP
ANION GAP SERPL CALC-SCNC: 15 MMOL/L — SIGNIFICANT CHANGE UP (ref 5–17)
ANION GAP SERPL CALC-SCNC: 16 MMOL/L — SIGNIFICANT CHANGE UP (ref 5–17)
ANION GAP SERPL CALC-SCNC: 17 MMOL/L — SIGNIFICANT CHANGE UP (ref 5–17)
ANISOCYTOSIS BLD QL: SLIGHT — SIGNIFICANT CHANGE UP
APPEARANCE UR: CLEAR — SIGNIFICANT CHANGE UP
APTT BLD: 29 SEC — SIGNIFICANT CHANGE UP (ref 27.5–37.4)
AST SERPL-CCNC: 19 U/L — SIGNIFICANT CHANGE UP
AST SERPL-CCNC: 23 U/L — SIGNIFICANT CHANGE UP
BACTERIA # UR AUTO: ABNORMAL
BASOPHILS # BLD AUTO: 0 K/UL — SIGNIFICANT CHANGE UP (ref 0–0.2)
BASOPHILS NFR BLD AUTO: 0.2 % — SIGNIFICANT CHANGE UP (ref 0–2)
BASOPHILS NFR BLD AUTO: 1 % — SIGNIFICANT CHANGE UP (ref 0–2)
BILIRUB SERPL-MCNC: 0.3 MG/DL — LOW (ref 0.4–2)
BILIRUB SERPL-MCNC: 0.4 MG/DL — SIGNIFICANT CHANGE UP (ref 0.4–2)
BILIRUB UR-MCNC: NEGATIVE — SIGNIFICANT CHANGE UP
BLD GP AB SCN SERPL QL: SIGNIFICANT CHANGE UP
BUN SERPL-MCNC: 8 MG/DL — SIGNIFICANT CHANGE UP (ref 8–20)
CALCIUM SERPL-MCNC: 7.1 MG/DL — LOW (ref 8.6–10.2)
CALCIUM SERPL-MCNC: 7.4 MG/DL — LOW (ref 8.6–10.2)
CALCIUM SERPL-MCNC: 8.7 MG/DL — SIGNIFICANT CHANGE UP (ref 8.6–10.2)
CHLORIDE SERPL-SCNC: 104 MMOL/L — SIGNIFICANT CHANGE UP (ref 98–107)
CHLORIDE SERPL-SCNC: 108 MMOL/L — HIGH (ref 98–107)
CHLORIDE SERPL-SCNC: 96 MMOL/L — LOW (ref 98–107)
CO2 SERPL-SCNC: 19 MMOL/L — LOW (ref 22–29)
CO2 SERPL-SCNC: 20 MMOL/L — LOW (ref 22–29)
CO2 SERPL-SCNC: 21 MMOL/L — LOW (ref 22–29)
COLOR SPEC: YELLOW — SIGNIFICANT CHANGE UP
CREAT SERPL-MCNC: 1.06 MG/DL — SIGNIFICANT CHANGE UP (ref 0.5–1.3)
CREAT SERPL-MCNC: 1.09 MG/DL — SIGNIFICANT CHANGE UP (ref 0.5–1.3)
CREAT SERPL-MCNC: 1.33 MG/DL — HIGH (ref 0.5–1.3)
DIFF PNL FLD: ABNORMAL
EOSINOPHIL # BLD AUTO: 0 K/UL — SIGNIFICANT CHANGE UP (ref 0–0.5)
EOSINOPHIL NFR BLD AUTO: 0 % — SIGNIFICANT CHANGE UP (ref 0–6)
EPI CELLS # UR: SIGNIFICANT CHANGE UP
GLUCOSE SERPL-MCNC: 104 MG/DL — SIGNIFICANT CHANGE UP (ref 70–115)
GLUCOSE SERPL-MCNC: 105 MG/DL — SIGNIFICANT CHANGE UP (ref 70–115)
GLUCOSE SERPL-MCNC: 195 MG/DL — HIGH (ref 70–115)
GLUCOSE UR QL: NEGATIVE MG/DL — SIGNIFICANT CHANGE UP
HCG SERPL-ACNC: <5 MIU/ML — SIGNIFICANT CHANGE UP
HCG UR QL: NEGATIVE — SIGNIFICANT CHANGE UP
HCT VFR BLD CALC: 22.3 % — LOW (ref 37–47)
HCT VFR BLD CALC: 26.4 % — LOW (ref 37–47)
HGB BLD-MCNC: 7.2 G/DL — LOW (ref 12–16)
HGB BLD-MCNC: 8.5 G/DL — LOW (ref 12–16)
INR BLD: 1.13 RATIO — SIGNIFICANT CHANGE UP (ref 0.88–1.16)
KETONES UR-MCNC: NEGATIVE — SIGNIFICANT CHANGE UP
LACTATE BLDV-MCNC: 0.9 MMOL/L — SIGNIFICANT CHANGE UP (ref 0.5–2)
LACTATE BLDV-MCNC: 2.9 MMOL/L — HIGH (ref 0.5–2)
LEUKOCYTE ESTERASE UR-ACNC: ABNORMAL
LIDOCAIN IGE QN: 32 U/L — SIGNIFICANT CHANGE UP (ref 22–51)
LYMPHOCYTES # BLD AUTO: 0.8 K/UL — LOW (ref 1–4.8)
LYMPHOCYTES # BLD AUTO: 12 % — LOW (ref 20–55)
LYMPHOCYTES # BLD AUTO: 6.9 % — LOW (ref 20–55)
MACROCYTES BLD QL: SLIGHT — SIGNIFICANT CHANGE UP
MCHC RBC-ENTMCNC: 27.2 PG — SIGNIFICANT CHANGE UP (ref 27–31)
MCHC RBC-ENTMCNC: 27.2 PG — SIGNIFICANT CHANGE UP (ref 27–31)
MCHC RBC-ENTMCNC: 32.2 G/DL — SIGNIFICANT CHANGE UP (ref 32–36)
MCHC RBC-ENTMCNC: 32.3 G/DL — SIGNIFICANT CHANGE UP (ref 32–36)
MCV RBC AUTO: 84.2 FL — SIGNIFICANT CHANGE UP (ref 81–99)
MCV RBC AUTO: 84.3 FL — SIGNIFICANT CHANGE UP (ref 81–99)
MICROCYTES BLD QL: SLIGHT — SIGNIFICANT CHANGE UP
MONOCYTES # BLD AUTO: 0.9 K/UL — HIGH (ref 0–0.8)
MONOCYTES NFR BLD AUTO: 8.5 % — SIGNIFICANT CHANGE UP (ref 3–10)
NEUTROPHILS # BLD AUTO: 9.3 K/UL — HIGH (ref 1.8–8)
NEUTROPHILS NFR BLD AUTO: 83 % — HIGH (ref 37–73)
NEUTROPHILS NFR BLD AUTO: 84.1 % — HIGH (ref 37–73)
NEUTS BAND # BLD: 4 % — SIGNIFICANT CHANGE UP (ref 0–8)
NITRITE UR-MCNC: NEGATIVE — SIGNIFICANT CHANGE UP
OB PNL STL: NEGATIVE — SIGNIFICANT CHANGE UP
OVALOCYTES BLD QL SMEAR: SLIGHT — SIGNIFICANT CHANGE UP
PH UR: 7 — SIGNIFICANT CHANGE UP (ref 5–8)
PLAT MORPH BLD: NORMAL — SIGNIFICANT CHANGE UP
PLATELET # BLD AUTO: 190 K/UL — SIGNIFICANT CHANGE UP (ref 150–400)
PLATELET # BLD AUTO: 225 K/UL — SIGNIFICANT CHANGE UP (ref 150–400)
POIKILOCYTOSIS BLD QL AUTO: SLIGHT — SIGNIFICANT CHANGE UP
POTASSIUM SERPL-MCNC: 3 MMOL/L — LOW (ref 3.5–5.3)
POTASSIUM SERPL-MCNC: 3.3 MMOL/L — LOW (ref 3.5–5.3)
POTASSIUM SERPL-MCNC: 3.4 MMOL/L — LOW (ref 3.5–5.3)
POTASSIUM SERPL-SCNC: 3 MMOL/L — LOW (ref 3.5–5.3)
POTASSIUM SERPL-SCNC: 3.3 MMOL/L — LOW (ref 3.5–5.3)
POTASSIUM SERPL-SCNC: 3.4 MMOL/L — LOW (ref 3.5–5.3)
PROT SERPL-MCNC: 6.4 G/DL — LOW (ref 6.6–8.7)
PROT SERPL-MCNC: 8.1 G/DL — SIGNIFICANT CHANGE UP (ref 6.6–8.7)
PROT UR-MCNC: 30 MG/DL
PROTHROM AB SERPL-ACNC: 12.5 SEC — SIGNIFICANT CHANGE UP (ref 9.8–12.7)
RBC # BLD: 2.65 M/UL — LOW (ref 4.4–5.2)
RBC # BLD: 3.13 M/UL — LOW (ref 4.4–5.2)
RBC # FLD: 14.5 % — SIGNIFICANT CHANGE UP (ref 11–15.6)
RBC # FLD: 14.8 % — SIGNIFICANT CHANGE UP (ref 11–15.6)
RBC BLD AUTO: ABNORMAL
RBC CASTS # UR COMP ASSIST: SIGNIFICANT CHANGE UP /HPF (ref 0–4)
SODIUM SERPL-SCNC: 134 MMOL/L — LOW (ref 135–145)
SODIUM SERPL-SCNC: 139 MMOL/L — SIGNIFICANT CHANGE UP (ref 135–145)
SODIUM SERPL-SCNC: 143 MMOL/L — SIGNIFICANT CHANGE UP (ref 135–145)
SP GR SPEC: 1.01 — SIGNIFICANT CHANGE UP (ref 1.01–1.02)
TYPE + AB SCN PNL BLD: SIGNIFICANT CHANGE UP
UROBILINOGEN FLD QL: NEGATIVE MG/DL — SIGNIFICANT CHANGE UP
WBC # BLD: 11.1 K/UL — HIGH (ref 4.8–10.8)
WBC # BLD: 4.1 K/UL — LOW (ref 4.8–10.8)
WBC # FLD AUTO: 11.1 K/UL — HIGH (ref 4.8–10.8)
WBC # FLD AUTO: 4.1 K/UL — LOW (ref 4.8–10.8)
WBC UR QL: ABNORMAL

## 2018-04-30 PROCEDURE — 71045 X-RAY EXAM CHEST 1 VIEW: CPT | Mod: 26

## 2018-04-30 PROCEDURE — 74176 CT ABD & PELVIS W/O CONTRAST: CPT | Mod: 26

## 2018-04-30 PROCEDURE — 99292 CRITICAL CARE ADDL 30 MIN: CPT

## 2018-04-30 PROCEDURE — 99252 IP/OBS CONSLTJ NEW/EST SF 35: CPT

## 2018-04-30 PROCEDURE — 99291 CRITICAL CARE FIRST HOUR: CPT

## 2018-04-30 RX ORDER — MEROPENEM 1 G/30ML
1000 INJECTION INTRAVENOUS EVERY 8 HOURS
Qty: 0 | Refills: 0 | Status: DISCONTINUED | OUTPATIENT
Start: 2018-05-01 | End: 2018-05-04

## 2018-04-30 RX ORDER — MEROPENEM 1 G/30ML
1000 INJECTION INTRAVENOUS ONCE
Qty: 0 | Refills: 0 | Status: COMPLETED | OUTPATIENT
Start: 2018-04-30 | End: 2018-05-01

## 2018-04-30 RX ORDER — MEROPENEM 1 G/30ML
INJECTION INTRAVENOUS
Qty: 0 | Refills: 0 | Status: DISCONTINUED | OUTPATIENT
Start: 2018-05-01 | End: 2018-05-04

## 2018-04-30 RX ORDER — CEFTRIAXONE 500 MG/1
1 INJECTION, POWDER, FOR SOLUTION INTRAMUSCULAR; INTRAVENOUS ONCE
Qty: 0 | Refills: 0 | Status: COMPLETED | OUTPATIENT
Start: 2018-04-30 | End: 2018-04-30

## 2018-04-30 RX ORDER — SODIUM CHLORIDE 9 MG/ML
1700 INJECTION, SOLUTION INTRAVENOUS ONCE
Qty: 0 | Refills: 0 | Status: COMPLETED | OUTPATIENT
Start: 2018-04-30 | End: 2018-04-30

## 2018-04-30 RX ORDER — MIDODRINE HYDROCHLORIDE 2.5 MG/1
5 TABLET ORAL THREE TIMES A DAY
Qty: 0 | Refills: 0 | Status: DISCONTINUED | OUTPATIENT
Start: 2018-04-30 | End: 2018-05-01

## 2018-04-30 RX ORDER — IBUPROFEN 200 MG
600 TABLET ORAL ONCE
Qty: 0 | Refills: 0 | Status: COMPLETED | OUTPATIENT
Start: 2018-04-30 | End: 2018-04-30

## 2018-04-30 RX ORDER — SODIUM CHLORIDE 9 MG/ML
3 INJECTION INTRAMUSCULAR; INTRAVENOUS; SUBCUTANEOUS ONCE
Qty: 0 | Refills: 0 | Status: COMPLETED | OUTPATIENT
Start: 2018-04-30 | End: 2018-04-30

## 2018-04-30 RX ORDER — ACETAMINOPHEN 500 MG
650 TABLET ORAL ONCE
Qty: 0 | Refills: 0 | Status: COMPLETED | OUTPATIENT
Start: 2018-04-30 | End: 2018-04-30

## 2018-04-30 RX ORDER — SODIUM CHLORIDE 9 MG/ML
1000 INJECTION INTRAMUSCULAR; INTRAVENOUS; SUBCUTANEOUS ONCE
Qty: 0 | Refills: 0 | Status: DISCONTINUED | OUTPATIENT
Start: 2018-04-30 | End: 2018-04-30

## 2018-04-30 RX ORDER — HYDROMORPHONE HYDROCHLORIDE 2 MG/ML
0.5 INJECTION INTRAMUSCULAR; INTRAVENOUS; SUBCUTANEOUS ONCE
Qty: 0 | Refills: 0 | Status: DISCONTINUED | OUTPATIENT
Start: 2018-04-30 | End: 2018-04-30

## 2018-04-30 RX ADMIN — SODIUM CHLORIDE 1700 MILLILITER(S): 9 INJECTION, SOLUTION INTRAVENOUS at 15:26

## 2018-04-30 RX ADMIN — CEFTRIAXONE 100 GRAM(S): 500 INJECTION, POWDER, FOR SOLUTION INTRAMUSCULAR; INTRAVENOUS at 16:23

## 2018-04-30 RX ADMIN — SODIUM CHLORIDE 3 MILLILITER(S): 9 INJECTION INTRAMUSCULAR; INTRAVENOUS; SUBCUTANEOUS at 16:23

## 2018-04-30 RX ADMIN — SODIUM CHLORIDE 3 MILLILITER(S): 9 INJECTION INTRAMUSCULAR; INTRAVENOUS; SUBCUTANEOUS at 16:21

## 2018-04-30 RX ADMIN — HYDROMORPHONE HYDROCHLORIDE 0.5 MILLIGRAM(S): 2 INJECTION INTRAMUSCULAR; INTRAVENOUS; SUBCUTANEOUS at 18:08

## 2018-04-30 RX ADMIN — Medication 600 MILLIGRAM(S): at 18:52

## 2018-04-30 RX ADMIN — Medication 600 MILLIGRAM(S): at 19:22

## 2018-04-30 RX ADMIN — HYDROMORPHONE HYDROCHLORIDE 0.5 MILLIGRAM(S): 2 INJECTION INTRAMUSCULAR; INTRAVENOUS; SUBCUTANEOUS at 17:32

## 2018-04-30 RX ADMIN — Medication 650 MILLIGRAM(S): at 17:33

## 2018-04-30 NOTE — ED PROVIDER NOTE - MEDICAL DECISION MAKING DETAILS
21 y/o F with fever, back pain, and abdominal pain. Concern for acute febrile illness like pyelonephritis and severe anemia, will do labs, CT and re-evaluate

## 2018-04-30 NOTE — ED ADULT NURSE NOTE - OBJECTIVE STATEMENT
reuicveed pt AOx4, c/o abd pain and headache since friday. pt code Dr. Franki baca at bedside, see sepsis flow sheet fro additional info. pt pale, states hx of anemia pt denies vomiting, PO intolerance, neck pain, recent travel, sick contacts, SHx, headache, blurred vision, sinus congestion, hematuria, chest pain, sob, blurred vision or any other complaints.

## 2018-04-30 NOTE — ED ADULT NURSE REASSESSMENT NOTE - NS ED NURSE REASSESS COMMENT FT1
spoke to ICU KAUSHIK Meyers regarding drop in BP.  states give another bolus and repeat pressure. will come down to evaul patient.

## 2018-04-30 NOTE — ED PROVIDER NOTE - GASTROINTESTINAL, MLM
Abdomen soft, minimal tenderness without guarding or rebound, FAST exam negative Abdomen soft, minimal tenderness without guarding or rebound, FAST exam negative, Rectal Exam: guaiac negative Chaperone: Francia ()

## 2018-04-30 NOTE — ED PROVIDER NOTE - OBJECTIVE STATEMENT
23 y/o F pt with no significant medical hx presents to ED c/o abdominal pain associated with dizziness and lightheadedness since yesterday. Pt also states she has low back pain and fever. Pt has had anemia in the past when she was pregnant. She is currently sexually active and on birth control. She states her periods are normal. She had 2 normal pregnancies. Denies vaginal bleeding, GI bleed, cough, SOB, CP, and motor/sensory loss. No further complaints at this time. 23 y/o F pt with no significant medical hx presents to ED c/o abdominal pain associated with dizziness and lightheadedness since yesterday. Pt also states she has low back pain and fever. Pt has had anemia in the past when she was pregnant. She is currently sexually active and on birth control. She states her periods are normal. She had 2 normal pregnancies. Denies vaginal bleeding, GI bleed, cough, SOB, CP, and motor/sensory loss. No further complaints at this time.  : Merlyn

## 2018-04-30 NOTE — PROVIDER CONTACT NOTE (EICU) - BACKGROUND
Hx chronic anemia with CT showing R pylo/perinephric abscess. seen by urology and IR, and deemed candidate medical management at this time. lactate 0.9, BPs are in 80s systolic is mentating well and making urine. got ceftriaxone in ED, 1700 mL LR, getting 1 U PRBC for hgb that dropped to 7.2.

## 2018-04-30 NOTE — ED PROVIDER NOTE - PROGRESS NOTE DETAILS
Spoke with Radiologist, he states pt has large 9cmx1.7cm perinephric abscess on R kidney with severe pyelonephritis Spoke with Dr. Lokesh Sanderson (radiology), he states pt has large 9cmx1.7cm perinephric abscess on R kidney with severe pyelonephritis Case discussed with Urology, they will see the pt. Discussed case with IR attending. Discussed with MICU, will most likely admit to MICU.

## 2018-04-30 NOTE — ED ADULT NURSE REASSESSMENT NOTE - NS ED NURSE REASSESS COMMENT FT1
PRBC given. Consent in chart. Risks and benefits explained to patient. Patient verbalized understanding of risks and benefits. Patient aware of possible side effects. Vital signs stable. Second RN at bedside for confirmation. blood started at 20:30

## 2018-04-30 NOTE — CONSULT NOTE ADULT - SUBJECTIVE AND OBJECTIVE BOX
Urology  Attending:        HPI: 22 year old female with complaint of bilateral flank pain, abdominal pain and fever that she states originally started on Friday. She states she took OTC tylenol and symptoms improved. Today patient states abdominal/back pain returned and was severe so she came to the ED. She states she also felt chills at home however denies nausea, vomiting. She admits to dysuria that has been occurring for the past week. She denies any vaginal discharge, bleeding, blood in urine. She denies blood in stool. She states she has history of anemia however denies use of any medications. She states now she feels better and denies SOB, nausea, CP , dizziness. She states she still has the abdominal/back pain and states it is 7/10 in severity non radiating.     PAST MEDICAL & SURGICAL HISTORY:  No pertinent past medical history      REVIEW OF SYSTEMS      General:	    Skin/Breast:  	  Ophthalmologic:  	  ENMT:	    Respiratory and Thorax:  	  Cardiovascular:	    Gastrointestinal:	+abdominal pain      Genitourinary:	+dysuria     Musculoskeletal:	 +back pain     Neurological:	    Psychiatric:	    Hematology/Lymphatics:	    Endocrine:	    Allergic/Immunologic:	    MEDICATIONS  (STANDING):    MEDICATIONS  (PRN):      Allergies    No Known Allergies    Intolerances        SOCIAL HISTORY:      Vital Signs Last 24 Hrs  T(C): 39.1 (2018 14:49), Max: 39.1 (2018 14:49)  T(F): 102.3 (2018 14:49), Max: 102.3 (2018 14:49)  HR: 126 (2018 14:49) (126 - 126)  BP: 98/69 (2018 14:49) (98/69 - 98/69)  BP(mean): --  RR: 22 (2018 14:49) (22 - 22)  SpO2: 99% (2018 14:49) (99% - 99%)    PHYSICAL EXAM:      Constitutional: NAD, resting comfortably speaking in full sentences     Eyes: conjunctiva pale, PERRLA    Neck: supple, no JVD, trachea midline    Back: +cva tenderness bilaterally     Respiratory: CTABL    Cardiovascular: s1s2, RRR    Gastrointestinal: soft, +BS, no guarding, no rebound, suprapubic tenderness     Genitourinary: deferred     Rectal: deferred     Extremities: soft, compartment soft, warm, no calf pain     Neurological: grossly intact         LABS:                        7.2    4.1   )-----------( 190      ( 2018 19:18 )             22.3         143  |  108<H>  |  8.0  ----------------------------<  104  3.0<L>   |  19.0<L>  |  1.09    Ca    7.1<L>      2018 19:18    TPro  6.4<L>  /  Alb  3.5  /  TBili  0.3<L>  /  DBili  x   /  AST  19  /  ALT  12  /  AlkPhos  80      PT/INR - ( 2018 16:00 )   PT: 12.5 sec;   INR: 1.13 ratio         PTT - ( 2018 16:00 )  PTT:29.0 sec  Urinalysis Basic - ( 2018 17:45 )    Color: Yellow / Appearance: Clear / S.010 / pH: x  Gluc: x / Ketone: Negative  / Bili: Negative / Urobili: Negative mg/dL   Blood: x / Protein: 30 mg/dL / Nitrite: Negative   Leuk Esterase: Moderate / RBC: 0-2 /HPF / WBC 11-25   Sq Epi: x / Non Sq Epi: Occasional / Bacteria: Occasional        RADIOLOGY & ADDITIONAL STUDIES    EXAM: CT RENAL STONE WISE     PROCEDURE DATE: 2018         INTERPRETATION: CT renal stone (CT of the abdomen and pelvis without   contrast)       CLINICAL INFORMATION: Right Renal colic. Sepsis     TECHNIQUE: Contiguous axial 5 mm sections were obtained using single   helical acquisition through the abdomen and pelvis and were reconstructed as   axial 5 mm sections. Higher resolution axial and coronal images were   reconstructed through the kidneys. Oral and intravenous contrast were   withheld for this indication.     FINDINGS: No previous examinations are available for review.   There is diffuse swelling of the right kidney with perinephric stranding and   a anterior perinephric/subcapsular abscess measuring 7.95 x 4.9 x 1.8 cm .   there is marked perinephric stranding. There is free fluid in the pelvis.   Left kidney and collecting system unremarkable.   The lung bases are clear.     The liver demonstrates homogeneous attenuation with no focal lesion,   allowing for the noncontrast technique. Hepatic size and contours are   maintained. Hepatic and portal veins are not displaced. No intrahepatic or   common ductal dilatation is recognized. The gallbladder demonstrates no   calcified calculi or wall thickening. The pancreas is intact without ductal   dilatation or focal lesion. The spleen is normal in size.     No adrenal masses are found.       The reproductive organs are within normal limits.       No enlarged lymph nodes are found. No ascites is present. The osseous   structures are intact.     The bowel appears unremarkable. No obstruction, perforation or abscess is   recognized.       IMPRESSION: Severe right renal pyelonephritis with a large anterior   subcapsular/perinephric abscess as described.     Impression/Plan:

## 2018-04-30 NOTE — CONSULT NOTE ADULT - ASSESSMENT
21 y/o female with anemia, flank pain, right perinephric collection on CT. Pt febrile, WBC 4.1, HCT 22.3. Tachycardia may be secondary to anemia given normal WBC count. CT reviewed, which demonstrates oblong collection anterior to right kidney measuring up to 4 x 1.4 cm AP, with limited window for percutaneous access.  Given the size of the collection and limited safe window for percutaneous access for drainage - recommend IV antibiotics, aggressive resuscitation with close observation in the ICU.  If the patient's condition does not improve, or worsens, CT guided aspiration or drainage may be reconsidered.  Reconsult IR if the patient's clinical condition warrants.

## 2018-04-30 NOTE — ED PROVIDER NOTE - CRITICAL CARE PROVIDED
consultation with other physicians/direct patient care (not related to procedure)/interpretation of diagnostic studies/additional history taking

## 2018-04-30 NOTE — PROVIDER CONTACT NOTE (EICU) - RECOMMENDATIONS
start meropenem 1 G Q8, transfuse 2 U PRBCs, start midodrine 5 TID, if her condition worsens IR is willing to consider CT guided drainage of abscess. f/U blood and urine cultures, trend lactate.

## 2018-04-30 NOTE — CONSULT NOTE ADULT - ATTENDING COMMENTS
Impression:    rght renal abscess, cultures pending  sepsis nearly resolved    Plan:    recommend broad spectrum antibiotics  recommend IR drainage  Will follow. No surgical intervention indicated at this time

## 2018-05-01 DIAGNOSIS — N15.1 RENAL AND PERINEPHRIC ABSCESS: ICD-10-CM

## 2018-05-01 DIAGNOSIS — N12 TUBULO-INTERSTITIAL NEPHRITIS, NOT SPECIFIED AS ACUTE OR CHRONIC: ICD-10-CM

## 2018-05-01 DIAGNOSIS — A41.9 SEPSIS, UNSPECIFIED ORGANISM: ICD-10-CM

## 2018-05-01 DIAGNOSIS — D64.9 ANEMIA, UNSPECIFIED: ICD-10-CM

## 2018-05-01 LAB
ANION GAP SERPL CALC-SCNC: 15 MMOL/L — SIGNIFICANT CHANGE UP (ref 5–17)
BUN SERPL-MCNC: 7 MG/DL — LOW (ref 8–20)
CALCIUM SERPL-MCNC: 7.6 MG/DL — LOW (ref 8.6–10.2)
CHLORIDE SERPL-SCNC: 106 MMOL/L — SIGNIFICANT CHANGE UP (ref 98–107)
CO2 SERPL-SCNC: 20 MMOL/L — LOW (ref 22–29)
CREAT SERPL-MCNC: 0.86 MG/DL — SIGNIFICANT CHANGE UP (ref 0.5–1.3)
CULTURE RESULTS: SIGNIFICANT CHANGE UP
GLUCOSE SERPL-MCNC: 117 MG/DL — HIGH (ref 70–115)
GRAM STN FLD: SIGNIFICANT CHANGE UP
HCT VFR BLD CALC: 23.4 % — LOW (ref 37–47)
HCT VFR BLD CALC: 26 % — LOW (ref 37–47)
HGB BLD-MCNC: 7.4 G/DL — LOW (ref 12–16)
HGB BLD-MCNC: 8.3 G/DL — LOW (ref 12–16)
MAGNESIUM SERPL-MCNC: 2 MG/DL — SIGNIFICANT CHANGE UP (ref 1.6–2.6)
MCHC RBC-ENTMCNC: 26.6 PG — LOW (ref 27–31)
MCHC RBC-ENTMCNC: 26.9 PG — LOW (ref 27–31)
MCHC RBC-ENTMCNC: 31.6 G/DL — LOW (ref 32–36)
MCHC RBC-ENTMCNC: 31.9 G/DL — LOW (ref 32–36)
MCV RBC AUTO: 84.1 FL — SIGNIFICANT CHANGE UP (ref 81–99)
MCV RBC AUTO: 84.2 FL — SIGNIFICANT CHANGE UP (ref 81–99)
PHOSPHATE SERPL-MCNC: 1.8 MG/DL — LOW (ref 2.4–4.7)
PLATELET # BLD AUTO: 177 K/UL — SIGNIFICANT CHANGE UP (ref 150–400)
PLATELET # BLD AUTO: 260 K/UL — SIGNIFICANT CHANGE UP (ref 150–400)
POTASSIUM SERPL-MCNC: 3.9 MMOL/L — SIGNIFICANT CHANGE UP (ref 3.5–5.3)
POTASSIUM SERPL-SCNC: 3.9 MMOL/L — SIGNIFICANT CHANGE UP (ref 3.5–5.3)
RBC # BLD: 2.78 M/UL — LOW (ref 4.4–5.2)
RBC # BLD: 3.09 M/UL — LOW (ref 4.4–5.2)
RBC # FLD: 14.8 % — SIGNIFICANT CHANGE UP (ref 11–15.6)
RBC # FLD: 14.9 % — SIGNIFICANT CHANGE UP (ref 11–15.6)
SODIUM SERPL-SCNC: 141 MMOL/L — SIGNIFICANT CHANGE UP (ref 135–145)
SPECIMEN SOURCE: SIGNIFICANT CHANGE UP
SPECIMEN SOURCE: SIGNIFICANT CHANGE UP
WBC # BLD: 12.2 K/UL — HIGH (ref 4.8–10.8)
WBC # BLD: 16.2 K/UL — HIGH (ref 4.8–10.8)
WBC # FLD AUTO: 12.2 K/UL — HIGH (ref 4.8–10.8)
WBC # FLD AUTO: 16.2 K/UL — HIGH (ref 4.8–10.8)

## 2018-05-01 PROCEDURE — 99231 SBSQ HOSP IP/OBS SF/LOW 25: CPT

## 2018-05-01 PROCEDURE — 49405 IMAGE CATH FLUID COLXN VISC: CPT

## 2018-05-01 PROCEDURE — 99291 CRITICAL CARE FIRST HOUR: CPT

## 2018-05-01 RX ORDER — POTASSIUM CHLORIDE 20 MEQ
10 PACKET (EA) ORAL
Qty: 0 | Refills: 0 | Status: COMPLETED | OUTPATIENT
Start: 2018-05-01 | End: 2018-05-01

## 2018-05-01 RX ORDER — SODIUM CHLORIDE 9 MG/ML
1000 INJECTION, SOLUTION INTRAVENOUS
Qty: 0 | Refills: 0 | Status: DISCONTINUED | OUTPATIENT
Start: 2018-05-01 | End: 2018-05-01

## 2018-05-01 RX ORDER — THIAMINE MONONITRATE (VIT B1) 100 MG
200 TABLET ORAL
Qty: 0 | Refills: 0 | Status: DISCONTINUED | OUTPATIENT
Start: 2018-05-01 | End: 2018-05-02

## 2018-05-01 RX ORDER — NOREPINEPHRINE BITARTRATE/D5W 8 MG/250ML
0.05 PLASTIC BAG, INJECTION (ML) INTRAVENOUS
Qty: 8 | Refills: 0 | Status: DISCONTINUED | OUTPATIENT
Start: 2018-05-01 | End: 2018-05-01

## 2018-05-01 RX ORDER — HYDROCORTISONE 20 MG
50 TABLET ORAL EVERY 6 HOURS
Qty: 0 | Refills: 0 | Status: DISCONTINUED | OUTPATIENT
Start: 2018-05-01 | End: 2018-05-03

## 2018-05-01 RX ORDER — POTASSIUM PHOSPHATE, MONOBASIC POTASSIUM PHOSPHATE, DIBASIC 236; 224 MG/ML; MG/ML
30 INJECTION, SOLUTION INTRAVENOUS ONCE
Qty: 0 | Refills: 0 | Status: COMPLETED | OUTPATIENT
Start: 2018-05-01 | End: 2018-05-01

## 2018-05-01 RX ORDER — ASCORBIC ACID 60 MG
1500 TABLET,CHEWABLE ORAL EVERY 6 HOURS
Qty: 0 | Refills: 0 | Status: DISCONTINUED | OUTPATIENT
Start: 2018-05-01 | End: 2018-05-02

## 2018-05-01 RX ORDER — ASCORBIC ACID 60 MG
1500 TABLET,CHEWABLE ORAL
Qty: 0 | Refills: 0 | Status: DISCONTINUED | OUTPATIENT
Start: 2018-05-01 | End: 2018-05-01

## 2018-05-01 RX ORDER — CALCIUM GLUCONATE 100 MG/ML
1 VIAL (ML) INTRAVENOUS ONCE
Qty: 0 | Refills: 0 | Status: COMPLETED | OUTPATIENT
Start: 2018-05-01 | End: 2018-05-01

## 2018-05-01 RX ADMIN — MEROPENEM 100 MILLIGRAM(S): 1 INJECTION INTRAVENOUS at 21:57

## 2018-05-01 RX ADMIN — Medication 50 MILLIGRAM(S): at 08:39

## 2018-05-01 RX ADMIN — Medication 102 MILLIGRAM(S): at 09:40

## 2018-05-01 RX ADMIN — POTASSIUM PHOSPHATE, MONOBASIC POTASSIUM PHOSPHATE, DIBASIC 83.33 MILLIMOLE(S): 236; 224 INJECTION, SOLUTION INTRAVENOUS at 09:40

## 2018-05-01 RX ADMIN — Medication 50 MILLIGRAM(S): at 17:16

## 2018-05-01 RX ADMIN — MEROPENEM 100 MILLIGRAM(S): 1 INJECTION INTRAVENOUS at 00:32

## 2018-05-01 RX ADMIN — MEROPENEM 100 MILLIGRAM(S): 1 INJECTION INTRAVENOUS at 13:18

## 2018-05-01 RX ADMIN — Medication 102 MILLIGRAM(S): at 20:33

## 2018-05-01 RX ADMIN — SODIUM CHLORIDE 125 MILLILITER(S): 9 INJECTION, SOLUTION INTRAVENOUS at 06:21

## 2018-05-01 RX ADMIN — Medication 50 MILLIGRAM(S): at 13:17

## 2018-05-01 RX ADMIN — Medication 103 MILLIGRAM(S): at 17:15

## 2018-05-01 RX ADMIN — Medication 100 MILLIEQUIVALENT(S): at 02:25

## 2018-05-01 RX ADMIN — MIDODRINE HYDROCHLORIDE 5 MILLIGRAM(S): 2.5 TABLET ORAL at 00:01

## 2018-05-01 RX ADMIN — Medication 6.81 MICROGRAM(S)/KG/MIN: at 01:24

## 2018-05-01 RX ADMIN — Medication 100 MILLIEQUIVALENT(S): at 00:44

## 2018-05-01 RX ADMIN — Medication 200 GRAM(S): at 11:55

## 2018-05-01 RX ADMIN — MEROPENEM 100 MILLIGRAM(S): 1 INJECTION INTRAVENOUS at 06:06

## 2018-05-01 RX ADMIN — Medication 100 MILLIEQUIVALENT(S): at 03:17

## 2018-05-01 RX ADMIN — Medication 103 MILLIGRAM(S): at 13:18

## 2018-05-01 RX ADMIN — Medication 103 MILLIGRAM(S): at 23:56

## 2018-05-01 RX ADMIN — Medication 50 MILLIGRAM(S): at 23:56

## 2018-05-01 NOTE — H&P ADULT - ASSESSMENT
22 year old femal with sepis/septic shock , right pyelonephritis, perinephric abscess and anemia requiting blood transfusion     Critical Care time: 40 mins assessing presenting problems of acute illness that poses high probability of life threatening deterioration or end organ damage/dysfunction.  Medical decision making inculding Initiating plan of care, reviewing data, reviewing radiology,direct patient bedside evaluation and interpretation of vital signs, any necessary ventilator management , discusion with multidisciplinary team, discussing goals of care with patient/family, all non inclusive of procedures 22 year old female with sepis/septic shock , right pyelonephritis, perinephric abscess and anemia requiring blood transfusion     Critical Care time: 40 mins assessing presenting problems of acute illness that poses high probability of life threatening deterioration or end organ damage/dysfunction.  Medical decision making inculding Initiating plan of care, reviewing data, reviewing radiology,direct patient bedside evaluation and interpretation of vital signs, any necessary ventilator management , discusion with multidisciplinary team, discussing goals of care with patient/family, all non inclusive of procedures

## 2018-05-01 NOTE — H&P ADULT - ATTENDING COMMENTS
Pt admitted by [facility] PA, as supervised by eICU physician. I have seen and evaluated this patient independently and agree with the above findings except as follows: 22 year old female with perinephric abscess severe sepsis now in shock on small dose of levophed despite volume resusc. Continue broad spectrum abx, narrow as [place] [facility] protocol ordered, will need source control via IR drainage of abscess.

## 2018-05-01 NOTE — H&P ADULT - PROBLEM SELECTOR PLAN 4
history of chronic anemeia  transfusion 1 unit PRBC   repeat CBC history of chronic anemia  transfusion 1 unit PRBC   repeat CBC

## 2018-05-01 NOTE — CHART NOTE - NSCHARTNOTEFT_GEN_A_CORE
Critical Care PA - Pita     22 year old female who presented to ER with no significant medical hx complaining of abdominal pain associated with dizziness and lightheadedness for two days and low back/flank pain with fever. CT scan finds severe right renal pyonephritis wiht large anterior perinephric abscess. She was seen by urology ( no surgical intervention at this time) seen by interventional radiology with plans for  IR drainage when stable. Pt has history fo c;hronic anemia curreently has hemoglobin of 7.2  has recived one unit of PRBC with repeat CBC pending. Pt does not have any signs of bleeding, is hypotensive with systolic blood pressures in low 80's despite 3 liters of IV fluid bolus. Initial lactate was elevated but did respond to fluids and normalize but blood pressure is not stabilizing .   Pt was transiently on Levophed off since this AM.  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      Will Need Renal ultrasound tomorrow to evaluate complete drainage.  Urology following     Pt is Pain free on exam, Without complaints.  Has been HDS since coming off IV pressors this morning and is stable for transfer to medical floor.    Case endorsed to Dr. Hoffmann of the Hospitalist service.      Christo Lema PAC

## 2018-05-01 NOTE — PROGRESS NOTE ADULT - SUBJECTIVE AND OBJECTIVE BOX
CC: I have an infection in my kidney   HPI: 23 y/o female with hx of chronic anemia, admitted to ICU yesterday with severe sepsis/septic shock from UTI/Right sided pyelonephritis with tiki-nephric abscess s/p right sided drain placed by IR. Patient is off pressors, with normal SBP, normal mentation, and afebrile. Cultures still pending. Patient states she was fine until she started having right sided cva pain and dysuria/frequency on Friday followed by fever, and chills which is what prompted her to come in to the Hospital. Denies prior hx of UTIs or hospitalizations other than for child birth. Patient seen in ICU who is not comfortable with only complaint of some mild pain at site of drain.   PMhx: Chronic anemia   All: NKDA  Psurghx: Denies  Sochx: No smoking, etoh, or illicit drugs  Meds: No prescription meds, on MVI/Iron  ROS: As per HPI, no CP, SOB, N/V. Remainder of ROS reported as neg  Famhx: No anemia, CVA, DM, CAD, or malignancy  PE:  VS: Afebrile, 100/70, 18, 70R, 98%RA, pain 2/10  GEN: NAD, awake/alert/comfortable  HEENT: NC/AT, slight conjunctival pallor, no icterus  NECK: supple  CVS: S1S2 no m/r/g  PULM: CTA B/L  ABD: soft, no rebound/guarding, right sided drain with turbid urine in bag  EXTR: no c/c/e, no calf pain or cords  SKIN: no rahs/lesions  NEURO: AAOX3, NFD    Labs: WBC 16.2 Hbg 7.4 Plt 260  PO4 1.8, Ca 7.6 Alb 3.5   Urine Cx <10k GPCC, GNR  Drain Cx: NGTD  Occult blood neg    CT Abd/pelvis: Right sided pyelonephritis w/ collection    A/P: 23 y/o female with resolving severe sepsis/septic shock from right sided pyelonephritis/UTI, chronic anemia, Leukocytosis, hypophosphatemia, Hypocalcemia  - Cont. drain per urology, f/u blood/Urine cx and adjust abx as indicated  - trend wbc/temp curve  - Add probiotics, Incentive spirometer, cont. diet   - prn analgesia  - OOB, ambulated, VC boots while in bed  - Anemia appears to be from iron def based on indicis, unable to make assessment if I order iron studies due to prbc transfusion here. Occult blood neg. Prior Hbg in 7-8 range. Describes normal periods with no DUB. Can proceed with o/p anemia w/u at this point if Hbg remains at baseline    - Replace lytes, f/u repeats in AM  - Discussed with patient, ICU nurse, ICU PA  - Agree with transfer out of ICU. CC: I have an infection in my kidney   HPI: 21 y/o female with hx of chronic anemia, admitted to ICU yesterday with severe sepsis/septic shock from UTI/Right sided pyelonephritis with tiki-nephric abscess s/p right sided drain placed by IR. Patient is off pressors, with normal SBP, normal mentation, and afebrile. Cultures still pending. Patient states she was fine until she started having right sided cva pain and dysuria/frequency on Friday followed by fever, and chills which is what prompted her to come in to the Hospital. Denies prior hx of UTIs or hospitalizations other than for child birth. Patient seen in ICU who is not comfortable with only complaint of some mild pain at site of drain.   PMhx: Chronic anemia   All: NKDA  Psurghx: Denies  Sochx: No smoking, etoh, or illicit drugs  Meds: No prescription meds, on MVI/Iron  ROS: As per HPI, no CP, SOB, N/V. Remainder of ROS reported as neg  Famhx: No anemia, CVA, DM, CAD, or malignancy  PE:  VS: Afebrile, 100/70, 18, 70R, 98%RA, pain 2/10  GEN: NAD, awake/alert/comfortable  HEENT: NC/AT, slight conjunctival pallor, no icterus  NECK: supple  CVS: S1S2 no m/r/g  PULM: CTA B/L  ABD: soft, no rebound/guarding, right sided drain with turbid urine in bag  EXTR: no c/c/e, no calf pain or cords  SKIN: no rahs/lesions  NEURO: AAOX3, NFD    Labs: WBC 16.2 Hbg 7.4 Plt 260  PO4 1.8, Ca 7.6 Alb 3.5   Urine Cx <10k GPCC, GNR  Drain Cx: NGTD  Occult blood neg    CT Abd/pelvis: Right sided pyelonephritis w/ collection    A/P: 21 y/o female with resolving severe sepsis/septic shock from right sided pyelonephritis/UTI, chronic anemia, Leukocytosis, hypophosphatemia, Hypocalcemia  - Complete Marik protocol   - Cont. drain per urology, f/u blood/Urine cx and adjust abx as indicated  - trend wbc/temp curve  - Add probiotics, Incentive spirometer, cont. diet   - prn analgesia  - OOB, ambulated, VC boots while in bed  - Anemia appears to be from iron def based on indicis, unable to make assessment if I order iron studies due to prbc transfusion here. Occult blood neg. Prior Hbg in 7-8 range. Describes normal periods with no DUB. Can proceed with o/p anemia w/u at this point if Hbg remains at baseline    - Replace lytes, f/u repeats in AM  - Discussed with patient, ICU nurse, ICU PA  - Agree with transfer out of ICU.

## 2018-05-01 NOTE — H&P ADULT - HISTORY OF PRESENT ILLNESS
This is a 22 year old female who presented to ER with no significant medical hx complaining of abdominal pain associated with dizziness and lightheadedness for two days and low back/flank pain with fever. CT scan finds severe right renal pyonephritis wiht large anterior perinephric abscess. She was seen by urology ( no surgical intervention at this time) seen by interventional radiology with plans for  IR drainage when stable. Pt has history fo c;hronic anemia curreently has hemoglobin of 7.2  has recived one unit of PRBC with repeat CBC pending. Pt does not have any signs of bleeding, is hypotensive with sysolic blood pressures in low 80's despite 3 literes of IV fluid bolus This is a 22 year old female who presented to ER with no significant medical hx complaining of abdominal pain associated with dizziness and lightheadedness for two days and low back/flank pain with fever. CT scan finds severe right renal pyonephritis wiht large anterior perinephric abscess. She was seen by urology ( no surgical intervention at this time) seen by interventional radiology with plans for  IR drainage when stable. Pt has history fo c;hronic anemia curreently has hemoglobin of 7.2  has recived one unit of PRBC with repeat CBC pending. Pt does not have any signs of bleeding, is hypotensive with systolic blood pressures in low 80's despite 3 liters of IV fluid bolus. Initial lactate was elevated but did respond to fluids and normalize but blood pressure is not stabilizing . The initial plan was to try midodrine but pt is persistently hypotensive with low maps and as result will start low dose levophed to sustain map greater than 65 and wean id midodrine becomes effective

## 2018-05-01 NOTE — PROGRESS NOTE ADULT - SUBJECTIVE AND OBJECTIVE BOX
HANH MA  [id number]  22y, Female    Sepsis      Patient is a 22y old  Female who presents with a chief complaint of Urosepsis / perinephric abscess (01 May 2018 00:38)      HPI:  This is a 22 year old female who presented to ER with no significant medical hx complaining of abdominal pain associated with dizziness and lightheadedness for two days and low back/flank pain with fever. CT scan finds severe right renal pyonephritis wiht large anterior perinephric abscess. She was seen by urology ( no surgical intervention at this time) seen by interventional radiology with plans for  IR drainage when stable. Pt has history fo c;hronic anemia curreently has hemoglobin of 7.2  has recived one unit of PRBC with repeat CBC pending. Pt does not have any signs of bleeding, is hypotensive with systolic blood pressures in low 80's despite 3 liters of IV fluid bolus. Initial lactate was elevated but did respond to fluids and normalize but blood pressure is not stabilizing . The initial plan was to try midodrine but pt is persistently hypotensive with low maps and as result will start low dose levophed to sustain map greater than 65 and wean id midodrine becomes effective (01 May 2018 00:38)    patient drained in IR today. She denies chills. no hematuria. Pain is a little better.     Allergies    No Known Allergies    Intolerances        MEDICATIONS  (STANDING):  ascorbic acid IVPB 1500 milliGRAM(s) IV Intermittent every 6 hours  hydrocortisone sodium succinate Injectable 50 milliGRAM(s) IV Push every 6 hours  meropenem  IVPB      meropenem  IVPB 1000 milliGRAM(s) IV Intermittent every 8 hours  norepinephrine Infusion 0.05 MICROgram(s)/kG/Min (6.806 mL/Hr) IV Continuous <Continuous>  thiamine IVPB 200 milliGRAM(s) IV Intermittent <User Schedule>    MEDICATIONS  (PRN):      PAST MEDICAL & SURGICAL HISTORY:  No pertinent past medical history  No significant past surgical history      I&O's Detail    2018 07:01  -  01 May 2018 07:00  --------------------------------------------------------  IN:    multiple electrolytes Injection Type 1multiple electrolytes Injection Type 1: 250 mL    norepinephrine Infusion: 18.9 mL    Solution: 50 mL    Solution: 300 mL  Total IN: 618.9 mL    OUT:    Voided: 1150 mL  Total OUT: 1150 mL    Total NET: -531.1 mL      01 May 2018 07:01  -  01 May 2018 18:05  --------------------------------------------------------  IN:    multiple electrolytes Injection Type 1multiple electrolytes Injection Type 1: 875 mL    norepinephrine Infusion: 1.5 mL    Solution: 100 mL    Solution: 100 mL    Solution: 499.8 mL    Solution: 100 mL    Solution: 50 mL  Total IN: 1726.3 mL    OUT:    Voided: 1100 mL  Total OUT: 1100 mL    Total NET: 626.3 mL          PE:    Vital Signs Last 24 Hrs  T(C): 36.7 (01 May 2018 11:54), Max: 37.2 (2018 22:55)  T(F): 98.1 (01 May 2018 11:54), Max: 98.9 (2018 22:55)  HR: 80 (01 May 2018 13:20) (65 - 105)  BP: 94/66 (01 May 2018 13:20) (72/41 - 107/68)  BP(mean): 76 (01 May 2018 13:20) (53 - 84)  RR: 9 (01 May 2018 13:20) (6 - 23)  SpO2: 99% (01 May 2018 13:20) (94% - 100%)    Daily     Daily Weight in k.6 (01 May 2018 05:00)    PHYSICAL EXAM:      Constitutional: appears slightly uncomfortable    Eyes: normal    Respiratory: no respiratory distress    Genitourinary: drain appears to be draining clear yellow urine.    Psychiatric: alert and oriented X 3        Labs:                          7.4    16.2  )-----------( 260      ( 01 May 2018 06:14 )             23.4       05-    141  |  106  |  7.0<L>  ----------------------------<  117<H>  3.9   |  20.0<L>  |  0.86    Ca    7.6<L>      01 May 2018 06:14  Phos  1.8     -  Mg     2.0     -    TPro  6.4<L>  /  Alb  3.5  /  TBili  0.3<L>  /  DBili  x   /  AST  19  /  ALT  12  /  AlkPhos  80  -30      Urinalysis Basic - ( 2018 17:45 )    Color: Yellow / Appearance: Clear / S.010 / pH: x  Gluc: x / Ketone: Negative  / Bili: Negative / Urobili: Negative mg/dL   Blood: x / Protein: 30 mg/dL / Nitrite: Negative   Leuk Esterase: Moderate / RBC: 0-2 /HPF / WBC 11-25   Sq Epi: x / Non Sq Epi: Occasional / Bacteria: Occasional        Impression:  urinoma, consider renal ultrasound to assess complete drainage.      Plan:    no  intervention at this time    Yan Simpson MD  18 @ 18:05

## 2018-05-01 NOTE — H&P ADULT - NSHPLABSRESULTS_GEN_ALL_CORE
143  |  108<H>  |  8.0  ----------------------------<  104  3.0<L>   |  19.0<L>  |  1.09    Ca    7.1<L>      30 Apr 2018 19:18  TPro  6.4<L>  /  Alb  3.5  /  TBili  0.3<L>  /  DBili  x   /  AST  19  /  ALT  12  /  AlkPhos  80  04-30                        7.2    4.1   )-----------( 190      ( 30 Apr 2018 19:18 )             22.3     PT/INR - ( 30 Apr 2018 16:00 )   PT: 12.5 sec;   INR: 1.13 ratio    PTT - ( 30 Apr 2018 16:00 )  PTT:29.0 sec    LIVER FUNCTIONS - ( 30 Apr 2018 19:18 )  Alb: 3.5 g/dL / Pro: 6.4 g/dL / ALK PHOS: 80 U/L / ALT: 12 U/L / AST: 19 U/L / GGT: x             Radiology     Ct abdomen   There is diffuse swelling of the right kidney with perinephric stranding and   a anterior perinephric/subcapsular abscess measuring 7.95 x 4.9 x 1.8 cm .   there is marked perinephric stranding. There is free fluid in the pelvis.   Left kidney and collecting system unremarkable.

## 2018-05-01 NOTE — BRIEF OPERATIVE NOTE - PROCEDURE
<<-----Click on this checkbox to enter Procedure Drainage of right kidney, via natural or artificial opening, diagnostic  05/01/2018  rt perirenal urinoma  Active  WFORMAN

## 2018-05-01 NOTE — H&P ADULT - NSHPREVIEWOFSYSTEMS_GEN_ALL_CORE
CONSTITUTIONAL: 2 days of fever and fatigue  EYES: No eye pain, visual disturbances, or discharge  ENMT:  No difficulty hearing, tinnitus, vertigo; No sinus or throat pain  NECK: No pain or stiffness  BREASTS: No pain, masses, or nipple discharge  RESPIRATORY: No cough, wheezing, chills or hemoptysis; No shortness of breath  CARDIOVASCULAR: No chest pain, palpitations, dizziness, or leg swelling  GASTROINTESTINAL: No abdominal or epigastric pain. No nausea, vomiting, or hematemesis; No diarrhea or constipation. No melena or hematochezia.  GENITOURINARY: positive dysuria, no hematuria, or incontinence  NEUROLOGICAL: No headaches, memory loss, loss of strength, numbness, or tremors  SKIN: No itching, burning, rashes, or lesions   LYMPH NODES: No enlarged glands  ENDOCRINE: No heat or cold intolerance; No hair loss  MUSCULOSKELETAL: No joint pain or swelling; No muscle, back, or extremity pain  PSYCHIATRIC: No depression, anxiety, mood swings, or difficulty sleeping  HEME/LYMPH: No easy bruising, or bleeding gums  ALLERY AND IMMUNOLOGIC: No hives or eczema

## 2018-05-02 LAB
24R-OH-CALCIDIOL SERPL-MCNC: 8.3 NG/ML — LOW (ref 30–80)
ALBUMIN SERPL ELPH-MCNC: 3.2 G/DL — LOW (ref 3.3–5.2)
ALP SERPL-CCNC: 78 U/L — SIGNIFICANT CHANGE UP (ref 40–120)
ALT FLD-CCNC: 12 U/L — SIGNIFICANT CHANGE UP
ANION GAP SERPL CALC-SCNC: 16 MMOL/L — SIGNIFICANT CHANGE UP (ref 5–17)
AST SERPL-CCNC: 17 U/L — SIGNIFICANT CHANGE UP
BILIRUB SERPL-MCNC: <0.2 MG/DL — LOW (ref 0.4–2)
BUN SERPL-MCNC: 8 MG/DL — SIGNIFICANT CHANGE UP (ref 8–20)
CALCIUM SERPL-MCNC: 8.3 MG/DL — LOW (ref 8.6–10.2)
CHLORIDE SERPL-SCNC: 106 MMOL/L — SIGNIFICANT CHANGE UP (ref 98–107)
CO2 SERPL-SCNC: 19 MMOL/L — LOW (ref 22–29)
CREAT SERPL-MCNC: 0.74 MG/DL — SIGNIFICANT CHANGE UP (ref 0.5–1.3)
GLUCOSE SERPL-MCNC: 207 MG/DL — HIGH (ref 70–115)
HCT VFR BLD CALC: 31.1 % — LOW (ref 37–47)
HGB BLD-MCNC: 10.1 G/DL — LOW (ref 12–16)
MAGNESIUM SERPL-MCNC: 2.2 MG/DL — SIGNIFICANT CHANGE UP (ref 1.6–2.6)
MCHC RBC-ENTMCNC: 27.1 PG — SIGNIFICANT CHANGE UP (ref 27–31)
MCHC RBC-ENTMCNC: 32.5 G/DL — SIGNIFICANT CHANGE UP (ref 32–36)
MCV RBC AUTO: 83.4 FL — SIGNIFICANT CHANGE UP (ref 81–99)
PHOSPHATE SERPL-MCNC: 1.8 MG/DL — LOW (ref 2.4–4.7)
PLATELET # BLD AUTO: 234 K/UL — SIGNIFICANT CHANGE UP (ref 150–400)
POTASSIUM SERPL-MCNC: 3.5 MMOL/L — SIGNIFICANT CHANGE UP (ref 3.5–5.3)
POTASSIUM SERPL-SCNC: 3.5 MMOL/L — SIGNIFICANT CHANGE UP (ref 3.5–5.3)
PROT SERPL-MCNC: 6.7 G/DL — SIGNIFICANT CHANGE UP (ref 6.6–8.7)
PTH-INTACT FLD-MCNC: 45 PG/ML — SIGNIFICANT CHANGE UP (ref 15–65)
RBC # BLD: 3.73 M/UL — LOW (ref 4.4–5.2)
RBC # FLD: 15.3 % — SIGNIFICANT CHANGE UP (ref 11–15.6)
SODIUM SERPL-SCNC: 141 MMOL/L — SIGNIFICANT CHANGE UP (ref 135–145)
WBC # BLD: 12.5 K/UL — HIGH (ref 4.8–10.8)
WBC # FLD AUTO: 12.5 K/UL — HIGH (ref 4.8–10.8)

## 2018-05-02 PROCEDURE — 99233 SBSQ HOSP IP/OBS HIGH 50: CPT | Mod: GC

## 2018-05-02 PROCEDURE — 76775 US EXAM ABDO BACK WALL LIM: CPT | Mod: 26

## 2018-05-02 RX ORDER — THIAMINE MONONITRATE (VIT B1) 100 MG
200 TABLET ORAL AT BEDTIME
Qty: 0 | Refills: 0 | Status: DISCONTINUED | OUTPATIENT
Start: 2018-05-02 | End: 2018-05-04

## 2018-05-02 RX ORDER — SODIUM CHLORIDE 9 MG/ML
1000 INJECTION INTRAMUSCULAR; INTRAVENOUS; SUBCUTANEOUS ONCE
Qty: 0 | Refills: 0 | Status: COMPLETED | OUTPATIENT
Start: 2018-05-02 | End: 2018-05-02

## 2018-05-02 RX ORDER — SODIUM CHLORIDE 9 MG/ML
1000 INJECTION INTRAMUSCULAR; INTRAVENOUS; SUBCUTANEOUS
Qty: 0 | Refills: 0 | Status: DISCONTINUED | OUTPATIENT
Start: 2018-05-02 | End: 2018-05-04

## 2018-05-02 RX ORDER — CALCIUM GLUCONATE 100 MG/ML
2 VIAL (ML) INTRAVENOUS ONCE
Qty: 0 | Refills: 0 | Status: COMPLETED | OUTPATIENT
Start: 2018-05-02 | End: 2018-05-02

## 2018-05-02 RX ORDER — ASCORBIC ACID 60 MG
500 TABLET,CHEWABLE ORAL THREE TIMES A DAY
Qty: 0 | Refills: 0 | Status: DISCONTINUED | OUTPATIENT
Start: 2018-05-02 | End: 2018-05-04

## 2018-05-02 RX ORDER — ACETAMINOPHEN 500 MG
650 TABLET ORAL EVERY 6 HOURS
Qty: 0 | Refills: 0 | Status: DISCONTINUED | OUTPATIENT
Start: 2018-05-02 | End: 2018-05-04

## 2018-05-02 RX ORDER — SACCHAROMYCES BOULARDII 250 MG
250 POWDER IN PACKET (EA) ORAL
Qty: 0 | Refills: 0 | Status: DISCONTINUED | OUTPATIENT
Start: 2018-05-02 | End: 2018-05-04

## 2018-05-02 RX ADMIN — Medication 250 MILLIGRAM(S): at 17:54

## 2018-05-02 RX ADMIN — SODIUM CHLORIDE 100 MILLILITER(S): 9 INJECTION INTRAMUSCULAR; INTRAVENOUS; SUBCUTANEOUS at 13:55

## 2018-05-02 RX ADMIN — MEROPENEM 100 MILLIGRAM(S): 1 INJECTION INTRAVENOUS at 13:55

## 2018-05-02 RX ADMIN — Medication 50 MILLIGRAM(S): at 11:40

## 2018-05-02 RX ADMIN — MEROPENEM 100 MILLIGRAM(S): 1 INJECTION INTRAVENOUS at 05:16

## 2018-05-02 RX ADMIN — Medication 500 MILLIGRAM(S): at 21:30

## 2018-05-02 RX ADMIN — Medication 102 MILLIGRAM(S): at 08:57

## 2018-05-02 RX ADMIN — SODIUM CHLORIDE 1000 MILLILITER(S): 9 INJECTION INTRAMUSCULAR; INTRAVENOUS; SUBCUTANEOUS at 08:29

## 2018-05-02 RX ADMIN — Medication 50 MILLIGRAM(S): at 05:17

## 2018-05-02 RX ADMIN — Medication 50 MILLIGRAM(S): at 21:31

## 2018-05-02 RX ADMIN — Medication 200 MILLIGRAM(S): at 21:30

## 2018-05-02 RX ADMIN — SODIUM CHLORIDE 2000 MILLILITER(S): 9 INJECTION INTRAMUSCULAR; INTRAVENOUS; SUBCUTANEOUS at 11:34

## 2018-05-02 RX ADMIN — Medication 250 MILLIGRAM(S): at 05:16

## 2018-05-02 RX ADMIN — Medication 500 MILLIGRAM(S): at 13:55

## 2018-05-02 RX ADMIN — Medication 50 MILLIGRAM(S): at 17:54

## 2018-05-02 RX ADMIN — MEROPENEM 100 MILLIGRAM(S): 1 INJECTION INTRAVENOUS at 21:31

## 2018-05-02 RX ADMIN — Medication 200 GRAM(S): at 02:21

## 2018-05-02 RX ADMIN — Medication 103 MILLIGRAM(S): at 05:16

## 2018-05-02 NOTE — PROGRESS NOTE ADULT - SUBJECTIVE AND OBJECTIVE BOX
Patient is a 22y old  Female who presents with a chief complaint of Urosepsis / perinephric abscess (01 May 2018 00:38)      OVERNIGHT EVENTS:  No acute events overnight. Patient seen and examined at bedside. She states at rest she has no pain and feels much better than when she came in.  She denies any fever, chills, abd pain, N/V/D, or dysuria. She states her feet are more swollen than usual.    Vital Signs  T(F): 97.9 (02 May 2018 03:38), Max: 98.7 (01 May 2018 09:00)  HR: 86 (02 May 2018 03:38) (65 - 102)  BP: 104/70 (02 May 2018 03:38) (82/51 - 115/68)  RR: 18 (02 May 2018 03:38) (6 - 37)  SpO2: 97% (02 May 2018 03:38) (94% - 100%)    PE:  VS: Afebrile, 100/70, 18, 70R, 98%RA, pain 2/10  GEN: NAD, awake/alert/comfortable  HEENT: NC/AT, slight conjunctival pallor, no icterus  NECK: supple  CVS: RRR, normal S1S2, no m/r/g  PULM: CTA B/L  ABD: soft, no rebound/guarding, right sided drain with yellow urine in bag  BACK: no CVA tenderness b/l  EXTR: no c/c/e, no calf pain or cords  SKIN: no rash/lesions  NEURO: AAOX3, NFD    I&O's Summary    30 Apr 2018 07:01  -  01 May 2018 07:00  --------------------------------------------------------  IN: 618.9 mL / OUT: 1150 mL / NET: -531.1 mL    01 May 2018 07:01  -  02 May 2018 05:12  --------------------------------------------------------  IN: 2401.3 mL / OUT: 2250 mL / NET: 151.3 mL      Labs  Pending AM labs    MEDICATIONS  (STANDING):  ascorbic acid IVPB 1500 milliGRAM(s) IV Intermittent every 6 hours  hydrocortisone sodium succinate Injectable 50 milliGRAM(s) IV Push every 6 hours  meropenem  IVPB      meropenem  IVPB 1000 milliGRAM(s) IV Intermittent every 8 hours  saccharomyces boulardii 250 milliGRAM(s) Oral two times a day  thiamine IVPB 200 milliGRAM(s) IV Intermittent <User Schedule>    MEDICATIONS  (PRN):

## 2018-05-02 NOTE — PROGRESS NOTE ADULT - ASSESSMENT
A/P: 23 y/o female with resolving severe sepsis/septic shock from right sided pyelonephritis/UTI, chronic anemia, Leukocytosis, hypophosphatemia, Hypocalcemia    Sepsis/Septic shock secondary to Pyelonephritis  - s/p pressors w/ Levophed  - Resolving, afebrile >24 hr  - Complete Marik protocol (IV Vitamin C, Hydrocortisone, and Thiamine)  - Continue Meropenem 1g q8h, Florastor 250mg BID  - Cont. drain per urology, f/u blood/Urine cx and adjust abx as indicated  - trend wbc/temp curve  - Continue Incentive spirometer, cont. diet   - PRN analgesia, Tylenol 650mg PRN  - Replace lytes, f/u repeats in AM    Normocytic Anemia, Chronic  - s/p 1U PRBC  - Anemia appears to be from iron def based on indicis, unable to make assessment from iron studies now due to in hospital prbc transfusion. Occult blood neg. Prior Hbg in 7-8 range. Describes normal periods with no DUB. Can proceed with o/p anemia w/u if Hbg remains at baseline      Prophylactic Measure  - Encourage ambulation  - VCD Boots A/P: 21 y/o female with resolving severe sepsis/septic shock from right sided pyelonephritis/UTI, chronic anemia, Leukocytosis, hypophosphatemia, Hypocalcemia    Septic shock secondary to Pyelonephritis, present on admission, currently resolving  - s/p pressors w/ Levophed, currently off of it  - Resolving, afebrile >24 hr  - Complete Marik protocol (IV Vitamin C, Hydrocortisone, and Thiamine)  - Continue Meropenem 1g q8h, Florastor 250mg BID  - Cont. drain per urology, f/u blood/Urine cx and adjust abx as indicated  - trend wbc/temp curve  - Continue Incentive spirometer, cont. diet   - PRN analgesia, Tylenol 650mg PRN  - Replace lytes, f/u repeats in AM    Normocytic Anemia, Chronic  - s/p 1U PRBC  - Anemia appears to be from iron def based on indicis, unable to make assessment from iron studies now due to in hospital prbc transfusion. Occult blood neg. Prior Hbg in 7-8 range. Describes normal periods with no DUB. Can proceed with o/p anemia w/u if Hbg remains at baseline      Prophylactic Measure  - Encourage ambulation  - VCD Boots

## 2018-05-03 LAB
ANION GAP SERPL CALC-SCNC: 12 MMOL/L — SIGNIFICANT CHANGE UP (ref 5–17)
BASOPHILS # BLD AUTO: 0 K/UL — SIGNIFICANT CHANGE UP (ref 0–0.2)
BASOPHILS NFR BLD AUTO: 0.2 % — SIGNIFICANT CHANGE UP (ref 0–2)
BUN SERPL-MCNC: 10 MG/DL — SIGNIFICANT CHANGE UP (ref 8–20)
CALCIUM SERPL-MCNC: 7.8 MG/DL — LOW (ref 8.6–10.2)
CALCIUM SERPL-MCNC: 8 MG/DL — LOW (ref 8.4–10.5)
CHLORIDE SERPL-SCNC: 108 MMOL/L — HIGH (ref 98–107)
CO2 SERPL-SCNC: 22 MMOL/L — SIGNIFICANT CHANGE UP (ref 22–29)
CREAT SERPL-MCNC: 0.7 MG/DL — SIGNIFICANT CHANGE UP (ref 0.5–1.3)
EOSINOPHIL # BLD AUTO: 0 K/UL — SIGNIFICANT CHANGE UP (ref 0–0.5)
EOSINOPHIL NFR BLD AUTO: 0 % — SIGNIFICANT CHANGE UP (ref 0–6)
GLUCOSE SERPL-MCNC: 123 MG/DL — HIGH (ref 70–115)
HCT VFR BLD CALC: 31.7 % — LOW (ref 37–47)
HGB BLD-MCNC: 10.3 G/DL — LOW (ref 12–16)
LYMPHOCYTES # BLD AUTO: 1.6 K/UL — SIGNIFICANT CHANGE UP (ref 1–4.8)
LYMPHOCYTES # BLD AUTO: 12.1 % — LOW (ref 20–55)
MAGNESIUM SERPL-MCNC: 2.1 MG/DL — SIGNIFICANT CHANGE UP (ref 1.6–2.6)
MCHC RBC-ENTMCNC: 27.1 PG — SIGNIFICANT CHANGE UP (ref 27–31)
MCHC RBC-ENTMCNC: 32.5 G/DL — SIGNIFICANT CHANGE UP (ref 32–36)
MCV RBC AUTO: 83.4 FL — SIGNIFICANT CHANGE UP (ref 81–99)
MONOCYTES # BLD AUTO: 0.7 K/UL — SIGNIFICANT CHANGE UP (ref 0–0.8)
MONOCYTES NFR BLD AUTO: 5.5 % — SIGNIFICANT CHANGE UP (ref 3–10)
NEUTROPHILS # BLD AUTO: 10.7 K/UL — HIGH (ref 1.8–8)
NEUTROPHILS NFR BLD AUTO: 81.2 % — HIGH (ref 37–73)
PHOSPHATE SERPL-MCNC: 2 MG/DL — LOW (ref 2.4–4.7)
PLATELET # BLD AUTO: 262 K/UL — SIGNIFICANT CHANGE UP (ref 150–400)
POTASSIUM SERPL-MCNC: 3.7 MMOL/L — SIGNIFICANT CHANGE UP (ref 3.5–5.3)
POTASSIUM SERPL-SCNC: 3.7 MMOL/L — SIGNIFICANT CHANGE UP (ref 3.5–5.3)
RBC # BLD: 3.8 M/UL — LOW (ref 4.4–5.2)
RBC # FLD: 15.3 % — SIGNIFICANT CHANGE UP (ref 11–15.6)
SODIUM SERPL-SCNC: 142 MMOL/L — SIGNIFICANT CHANGE UP (ref 135–145)
WBC # BLD: 13.2 K/UL — HIGH (ref 4.8–10.8)
WBC # FLD AUTO: 13.2 K/UL — HIGH (ref 4.8–10.8)

## 2018-05-03 PROCEDURE — 99233 SBSQ HOSP IP/OBS HIGH 50: CPT | Mod: GC

## 2018-05-03 PROCEDURE — 74176 CT ABD & PELVIS W/O CONTRAST: CPT | Mod: 26

## 2018-05-03 RX ORDER — HYDROCORTISONE 20 MG
50 TABLET ORAL EVERY 12 HOURS
Qty: 0 | Refills: 0 | Status: DISCONTINUED | OUTPATIENT
Start: 2018-05-03 | End: 2018-05-04

## 2018-05-03 RX ORDER — ERGOCALCIFEROL 1.25 MG/1
50000 CAPSULE ORAL ONCE
Qty: 0 | Refills: 0 | Status: COMPLETED | OUTPATIENT
Start: 2018-05-03 | End: 2018-05-03

## 2018-05-03 RX ORDER — POTASSIUM PHOSPHATE, MONOBASIC POTASSIUM PHOSPHATE, DIBASIC 236; 224 MG/ML; MG/ML
15 INJECTION, SOLUTION INTRAVENOUS ONCE
Qty: 0 | Refills: 0 | Status: COMPLETED | OUTPATIENT
Start: 2018-05-03 | End: 2018-05-03

## 2018-05-03 RX ADMIN — Medication 250 MILLIGRAM(S): at 17:12

## 2018-05-03 RX ADMIN — Medication 500 MILLIGRAM(S): at 22:40

## 2018-05-03 RX ADMIN — MEROPENEM 100 MILLIGRAM(S): 1 INJECTION INTRAVENOUS at 12:51

## 2018-05-03 RX ADMIN — Medication 500 MILLIGRAM(S): at 12:52

## 2018-05-03 RX ADMIN — Medication 250 MILLIGRAM(S): at 06:02

## 2018-05-03 RX ADMIN — Medication 50 MILLIGRAM(S): at 06:03

## 2018-05-03 RX ADMIN — Medication 200 MILLIGRAM(S): at 22:40

## 2018-05-03 RX ADMIN — ERGOCALCIFEROL 50000 UNIT(S): 1.25 CAPSULE ORAL at 12:51

## 2018-05-03 RX ADMIN — Medication 500 MILLIGRAM(S): at 06:02

## 2018-05-03 RX ADMIN — POTASSIUM PHOSPHATE, MONOBASIC POTASSIUM PHOSPHATE, DIBASIC 62.5 MILLIMOLE(S): 236; 224 INJECTION, SOLUTION INTRAVENOUS at 12:52

## 2018-05-03 RX ADMIN — MEROPENEM 100 MILLIGRAM(S): 1 INJECTION INTRAVENOUS at 06:02

## 2018-05-03 RX ADMIN — SODIUM CHLORIDE 100 MILLILITER(S): 9 INJECTION INTRAMUSCULAR; INTRAVENOUS; SUBCUTANEOUS at 12:52

## 2018-05-03 RX ADMIN — MEROPENEM 100 MILLIGRAM(S): 1 INJECTION INTRAVENOUS at 22:40

## 2018-05-03 RX ADMIN — Medication 50 MILLIGRAM(S): at 17:12

## 2018-05-03 RX ADMIN — SODIUM CHLORIDE 100 MILLILITER(S): 9 INJECTION INTRAMUSCULAR; INTRAVENOUS; SUBCUTANEOUS at 06:02

## 2018-05-03 NOTE — PROGRESS NOTE ADULT - ASSESSMENT
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 diet   - PRN analgesia, Tylenol 650mg PRN  - Replace lytes, f/u repeats in AM    Normocytic Anemia, Chronic  - s/p 1U PRBC  - Anemia appears to be from iron def based on indicis, unable to make assessment from iron studies now due to in hospital prbc transfusion. Occult blood neg. Prior Hbg in 7-8 range. Describes normal periods with no DUB. Can proceed with o/p anemia w/u if Hbg remains at baseline      Prophylactic Measure  - Encourage ambulation  - VCD Boots A/P: 21 y/o female with resolving severe sepsis/septic shock from right sided pyelonephritis/UTI, chronic anemia, Leukocytosis, hypophosphatemia, Hypocalcemia    Septic shock secondary to Pyelonephritis, present on admission, currently resolved  - s/p pressors w/ Levophed, currently off of it  - Resolved, afebrile >24 hr, mild leukocytosis still present but trending down  - Complete Marik protocol (IV Vitamin C, Hydrocortisone, and Thiamine)  - Continue Meropenem 1g q8h, Florastor 250mg BID  - IR drainage performed, 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 diet   - PRN analgesia, Tylenol 650mg PRN  - Replace lytes, f/u repeats in AM    Normocytic Anemia, Chronic  - s/p 1U PRBC  - Anemia appears to be from iron def based on indicis, unable to make assessment from iron studies now due to in hospital prbc transfusion. Occult blood neg. Prior Hbg in 7-8 range. Describes normal periods with no DUB. Can proceed with o/p anemia w/u if Hbg remains at baseline      Prophylactic Measure  - Encourage ambulation  - VCD Boots

## 2018-05-03 NOTE — PROGRESS NOTE ADULT - SUBJECTIVE AND OBJECTIVE BOX
Patient is a 22y old  Female who presents with a chief complaint of Urosepsis / perinephric abscess (01 May 2018 00:38)    Patient seen and examined at bedside. No acute overnight events. Patient has no complaints this morning.     ROS: denies fevers, chills, nausea, vomiting, chest pain, sob, palpitations, abdominal pain, flank pain, dysuria, diarrhea.     Vital Signs Last 24 Hrs  T(C): 36.8 (02 May 2018 23:00), Max: 37.2 (02 May 2018 16:32)  T(F): 98.3 (02 May 2018 23:00), Max: 98.9 (02 May 2018 16:32)  HR: 67 (02 May 2018 23:00) (67 - 67)  BP: 103/68 (02 May 2018 23:00) (103/68 - 108/72)  RR: 18 (02 May 2018 23:00) (18 - 18)  SpO2: 98% (02 May 2018 23:00) (98% - 98%)    Physical Exam:  General: NAD, resting comfortably in bed  HEENT: NC/AT, EOMI  Lungs: CTA bilaterally, no rhonchi, no wheezes, no rhonchi  Cardio: S1S2+, RRR, no murmurs  Abdominal: soft, BS+ normoactive, non-distended, non-tender, catheter in place draining clear light yellow urine  Extremities: no edema, no calf tenderness, DP pulses 2+ b/l  Neuro: AAOx3, no focal deficits     Labs:                     10.1   12.5  )-----------( 234      ( 02 May 2018 10:54 )             31.1   05-02    141  |  106  |  8.0  ----------------------------<  207<H>  3.5   |  19.0<L>  |  0.74    Ca    8.3<L>      02 May 2018 10:54  Phos  1.8     05-02  Mg     2.2     05-02    TPro  6.7  /  Alb  3.2<L>  /  TBili  <0.2<L>  /  DBili  x   /  AST  17  /  ALT  12  /  AlkPhos  78  05-02      MEDICATIONS  (STANDING):  ascorbic acid 500 milliGRAM(s) Oral three times a day  hydrocortisone sodium succinate Injectable 50 milliGRAM(s) IV Push every 6 hours  meropenem  IVPB      meropenem  IVPB 1000 milliGRAM(s) IV Intermittent every 8 hours  saccharomyces boulardii 250 milliGRAM(s) Oral two times a day  sodium chloride 0.9%. 1000 milliLiter(s) (100 mL/Hr) IV Continuous <Continuous>  thiamine 200 milliGRAM(s) Oral at bedtime    MEDICATIONS  (PRN):  acetaminophen   Tablet. 650 milliGRAM(s) Oral every 6 hours PRN Moderate Pain (4 - 6)

## 2018-05-04 ENCOUNTER — TRANSCRIPTION ENCOUNTER (OUTPATIENT)
Age: 23
End: 2018-05-04

## 2018-05-04 VITALS
DIASTOLIC BLOOD PRESSURE: 60 MMHG | SYSTOLIC BLOOD PRESSURE: 115 MMHG | OXYGEN SATURATION: 98 % | TEMPERATURE: 99 F | HEART RATE: 59 BPM

## 2018-05-04 LAB
ANION GAP SERPL CALC-SCNC: 12 MMOL/L — SIGNIFICANT CHANGE UP (ref 5–17)
ANISOCYTOSIS BLD QL: SLIGHT — SIGNIFICANT CHANGE UP
BASOPHILS # BLD AUTO: 0 K/UL — SIGNIFICANT CHANGE UP (ref 0–0.2)
BASOPHILS NFR BLD AUTO: 1 % — SIGNIFICANT CHANGE UP (ref 0–2)
BUN SERPL-MCNC: 9 MG/DL — SIGNIFICANT CHANGE UP (ref 8–20)
CALCIUM SERPL-MCNC: 7.9 MG/DL — LOW (ref 8.6–10.2)
CHLORIDE SERPL-SCNC: 106 MMOL/L — SIGNIFICANT CHANGE UP (ref 98–107)
CO2 SERPL-SCNC: 23 MMOL/L — SIGNIFICANT CHANGE UP (ref 22–29)
CREAT SERPL-MCNC: 0.6 MG/DL — SIGNIFICANT CHANGE UP (ref 0.5–1.3)
EOSINOPHIL # BLD AUTO: 0 K/UL — SIGNIFICANT CHANGE UP (ref 0–0.5)
EOSINOPHIL NFR BLD AUTO: 1 % — SIGNIFICANT CHANGE UP (ref 0–5)
GLUCOSE SERPL-MCNC: 102 MG/DL — SIGNIFICANT CHANGE UP (ref 70–115)
HCT VFR BLD CALC: 32.9 % — LOW (ref 37–47)
HGB BLD-MCNC: 10.5 G/DL — LOW (ref 12–16)
LYMPHOCYTES # BLD AUTO: 2.3 K/UL — SIGNIFICANT CHANGE UP (ref 1–4.8)
LYMPHOCYTES # BLD AUTO: 23 % — SIGNIFICANT CHANGE UP (ref 20–55)
MACROCYTES BLD QL: SLIGHT — SIGNIFICANT CHANGE UP
MCHC RBC-ENTMCNC: 27.1 PG — SIGNIFICANT CHANGE UP (ref 27–31)
MCHC RBC-ENTMCNC: 31.9 G/DL — LOW (ref 32–36)
MCV RBC AUTO: 84.8 FL — SIGNIFICANT CHANGE UP (ref 81–99)
MICROCYTES BLD QL: SLIGHT — SIGNIFICANT CHANGE UP
MONOCYTES # BLD AUTO: 0.6 K/UL — SIGNIFICANT CHANGE UP (ref 0–0.8)
MONOCYTES NFR BLD AUTO: 6 % — SIGNIFICANT CHANGE UP (ref 3–10)
MYELOCYTES NFR BLD: 1 % — HIGH (ref 0–0)
NEUTROPHILS # BLD AUTO: 6.5 K/UL — SIGNIFICANT CHANGE UP (ref 1.8–8)
NEUTROPHILS NFR BLD AUTO: 66 % — SIGNIFICANT CHANGE UP (ref 37–73)
NEUTS BAND # BLD: 2 % — SIGNIFICANT CHANGE UP (ref 0–8)
PHOSPHATE SERPL-MCNC: 1.8 MG/DL — LOW (ref 2.4–4.7)
PLAT MORPH BLD: NORMAL — SIGNIFICANT CHANGE UP
PLATELET # BLD AUTO: 304 K/UL — SIGNIFICANT CHANGE UP (ref 150–400)
POIKILOCYTOSIS BLD QL AUTO: SLIGHT — SIGNIFICANT CHANGE UP
POTASSIUM SERPL-MCNC: 3.5 MMOL/L — SIGNIFICANT CHANGE UP (ref 3.5–5.3)
POTASSIUM SERPL-SCNC: 3.5 MMOL/L — SIGNIFICANT CHANGE UP (ref 3.5–5.3)
RBC # BLD: 3.88 M/UL — LOW (ref 4.4–5.2)
RBC # FLD: 15.3 % — SIGNIFICANT CHANGE UP (ref 11–15.6)
RBC BLD AUTO: ABNORMAL
SODIUM SERPL-SCNC: 141 MMOL/L — SIGNIFICANT CHANGE UP (ref 135–145)
WBC # BLD: 9.8 K/UL — SIGNIFICANT CHANGE UP (ref 4.8–10.8)
WBC # FLD AUTO: 9.8 K/UL — SIGNIFICANT CHANGE UP (ref 4.8–10.8)

## 2018-05-04 PROCEDURE — 99239 HOSP IP/OBS DSCHRG MGMT >30: CPT

## 2018-05-04 RX ORDER — SODIUM,POTASSIUM PHOSPHATES 278-250MG
1 POWDER IN PACKET (EA) ORAL
Qty: 6 | Refills: 0 | OUTPATIENT
Start: 2018-05-04 | End: 2018-05-05

## 2018-05-04 RX ORDER — POTASSIUM PHOSPHATE, MONOBASIC POTASSIUM PHOSPHATE, DIBASIC 236; 224 MG/ML; MG/ML
30 INJECTION, SOLUTION INTRAVENOUS ONCE
Qty: 0 | Refills: 0 | Status: DISCONTINUED | OUTPATIENT
Start: 2018-05-04 | End: 2018-05-04

## 2018-05-04 RX ORDER — SACCHAROMYCES BOULARDII 250 MG
1 POWDER IN PACKET (EA) ORAL
Qty: 20 | Refills: 0 | OUTPATIENT
Start: 2018-05-04 | End: 2018-05-13

## 2018-05-04 RX ORDER — CIPROFLOXACIN LACTATE 400MG/40ML
1 VIAL (ML) INTRAVENOUS
Qty: 20 | Refills: 0 | OUTPATIENT
Start: 2018-05-04 | End: 2018-05-13

## 2018-05-04 RX ADMIN — Medication 250 MILLIGRAM(S): at 05:38

## 2018-05-04 RX ADMIN — SODIUM CHLORIDE 100 MILLILITER(S): 9 INJECTION INTRAMUSCULAR; INTRAVENOUS; SUBCUTANEOUS at 12:49

## 2018-05-04 RX ADMIN — SODIUM CHLORIDE 100 MILLILITER(S): 9 INJECTION INTRAMUSCULAR; INTRAVENOUS; SUBCUTANEOUS at 05:38

## 2018-05-04 RX ADMIN — Medication 50 MILLIGRAM(S): at 05:38

## 2018-05-04 RX ADMIN — Medication 500 MILLIGRAM(S): at 05:38

## 2018-05-04 RX ADMIN — MEROPENEM 100 MILLIGRAM(S): 1 INJECTION INTRAVENOUS at 05:39

## 2018-05-04 NOTE — DISCHARGE NOTE ADULT - MEDICATION SUMMARY - MEDICATIONS TO TAKE
I will START or STAY ON the medications listed below when I get home from the hospital:    ibuprofen 600 mg oral tablet  -- 1 tab(s) by mouth every 6 hours, As needed, Moderate Pain  -- Indication: For Pain    ferrous sulfate 325 mg (65 mg elemental iron) oral tablet  -- 1 tab(s) by mouth 2 times a day  -- Check with your doctor before becoming pregnant.  Do not chew, break, or crush.  May discolor urine or feces.    -- Indication: For Supplement    Prenatal Multivitamins with Folic Acid 0.2 mg oral tablet  -- 1 tab(s) by mouth once a day  -- Indication: For Supplement    saccharomyces boulardii lyo 250 mg oral capsule  -- 1 cap(s) by mouth 2 times a day  -- Indication: For Probiotic    ciprofloxacin 500 mg oral tablet  -- 1 tab(s) by mouth 2 times a day   -- Avoid prolonged or excessive exposure to direct and/or artificial sunlight while taking this medication.  Check with your doctor before becoming pregnant.  Do not take dairy products, antacids, or iron preparations within one hour of this medication.  Finish all this medication unless otherwise directed by prescriber.  Medication should be taken with plenty of water.    -- Indication: For Antibiotic for UTI    ascorbic acid 500 mg oral tablet  -- 1 tab(s) by mouth once a day take with iron tab  -- Indication: For Supplement

## 2018-05-04 NOTE — DISCHARGE NOTE ADULT - ADDITIONAL INSTRUCTIONS
Follow up with your primary care doctor at [facility] clinic within 1 week of discharge.   Take all medications and diet as prescribed above.

## 2018-05-04 NOTE — DISCHARGE NOTE ADULT - OTHER SIGNIFICANT FINDINGS
< from: CT Abdomen and Pelvis No Cont (05.03.18 @ 17:14) >  Tip of right-sided pigtail catheter located along the anterior aspect of   an   ill-defined right kidney in the location of previously demonstrated fluid   collection.  No significant residual fluid collection demonstrated on   limited   unenhanced exam. No significant hydronephrosis.    Increased small volume pelvic ascites.Diffuse subcutaneous edema, small   bilateral pleural effusions, mesenteric and retroperitoneal edema   increased since 4/30/18.    < end of copied text >    < from: IR CT Guided Needle Placement (05.01.18 @ 14:34) >  There was noticeable increase in the fluid collection   anterior to the liver compatible with enlarging urinoma. A safe window   was identified between bowel loops in the right anterior abdominal wall.   The fluid appeared clear and pale yellow. 16 mL of fluid were removed by   hand. Remaining fluid was allowed to drain by gravity drainage. Followup   images showed satisfactory coiling of the pigtail loop within the   collection.    < end of copied text > < from: CT Abdomen and Pelvis No Cont (05.03.18 @ 17:14) >  Tip of right-sided pigtail catheter located along the anterior aspect of   an   ill-defined right kidney in the location of previously demonstrated fluid   collection.  No significant residual fluid collection demonstrated on   limited   unenhanced exam. No significant hydronephrosis.    Increased small volume pelvic ascites.Diffuse subcutaneous edema, small   bilateral pleural effusions, mesenteric and retroperitoneal edema   increased since 4/30/18.    < end of copied text >    < from: IR CT Guided Needle Placement (05.01.18 @ 14:34) >  There was noticeable increase in the fluid collection   anterior to the liver compatible with enlarging urinoma. A safe window   was identified between bowel loops in the right anterior abdominal wall.   The fluid appeared clear and pale yellow. 16 mL of fluid were removed by   hand. Remaining fluid was allowed to drain by gravity drainage. Followup   images showed satisfactory coiling of the pigtail loop within the   collection.    < end of copied text >    Culture - Body Fluid with Gram Stain (05.01.18 @ 14:57)    Gram Stain:   Few White blood cells  No organisms seen    Specimen Source: .Body Fluid Abdominal Fluid    Culture Results:   No growth at 2 days.  Culture in progress    Culture - Urine (04.30.18 @ 17:45)    Specimen Source: .Urine Clean Catch (Midstream)    Culture Results:   <10,000 CFU/ml Gram Negative Rods  <10,000 CFU/ml Gram Positive Cocci in Clusters                          10.5   9.8   )-----------( 304      ( 04 May 2018 08:20 )             32.9   05-04    141  |  106  |  9.0  ----------------------------<  102  3.5   |  23.0  |  0.60    Ca    7.9<L>      04 May 2018 08:20  Phos  1.8     05-04  Mg     2.1     05-03

## 2018-05-04 NOTE — DISCHARGE NOTE ADULT - PROVIDER TOKENS
FREE:[LAST:[[facility]],PHONE:[(   )    -],FAX:[(   )    -],ADDRESS:[Address: [address] [place] [address]  [phone]]]

## 2018-05-04 NOTE — DISCHARGE NOTE ADULT - PATIENT PORTAL LINK FT
You can access the [facility], offered by [facility], by registering with the following website: [email or web address]

## 2018-05-04 NOTE — DISCHARGE NOTE ADULT - CARE PROVIDER_API CALL
[facility],   Address: [address] [place] [address]  [phone]  Phone: (   )    -  Fax: (   )    -

## 2018-05-04 NOTE — DISCHARGE NOTE ADULT - PLAN OF CARE
stable You received IV antibiotics at the hospital. Please continue taking oral antibiotics as prescribed above. You may also take a probiotic or eat yogurt. Please call your doctor if experiencing burning with urination, fevers (temp > 100.4), urinary frequency/urgency. You also had a urine collection in your right kidney that was drained without complications. Please follow up with your doctor for wound check. Your hemoglobin level is stable. Please follow up with your primary care doctor for further anemia work up.

## 2018-05-04 NOTE — DISCHARGE NOTE ADULT - CARE PLAN
Principal Discharge DX:	Pyelonephritis  Goal:	stable  Assessment and plan of treatment:	You received IV antibiotics at the hospital. Please continue taking oral antibiotics as prescribed above. You may also take a probiotic or eat yogurt. Please call your doctor if experiencing burning with urination, fevers (temp > 100.4), urinary frequency/urgency. You also had a urine collection in your right kidney that was drained without complications. Please follow up with your doctor for wound check.  Secondary Diagnosis:	Anemia  Goal:	stable  Assessment and plan of treatment:	Your hemoglobin level is stable. Please follow up with your primary care doctor for further anemia work up.

## 2018-05-04 NOTE — DISCHARGE NOTE ADULT - HOSPITAL COURSE
23 y/o female with resolving severe sepsis/septic shock from right sided pyelonephritis/UTI, chronic anemia, Leukocytosis, hypophosphatemia, Hypocalcemia.    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 diet   - PRN analgesia, Tylenol 650mg PRN  - Replace lytes, f/u repeats in AM    Normocytic Anemia, Chronic  - s/p 1U PRBC, stable  - Anemia appears to be from iron def based on indicis, unable to make assessment from iron studies now due to in hospital prbc transfusion. Occult blood neg. Prior Hbg in 7-8 range. Describes normal periods with no DUB. Can proceed with o/p anemia w/u if Hbg remains at baseline 21 y/o female with resolving severe sepsis/septic shock from right sided pyelonephritis/UTI, chronic anemia, Leukocytosis, hypophosphatemia, Hypocalcemia. Patient was admitted for Septic shock secondary to Pyelonephritis, present on admission, currently resolved. She received pressors w/ Levophed in the ICU, currently off of it. Sepsis resolved, afebrile >48 hrs, wbc trended down and now wnl. Completed Marik protocol (IV Vitamin C, Hydrocortisone, and Thiamine). Patient was given Meropenem 1g q8h x 4 days at the hospital. She will be discharged on Cipro to complete total of 2 wks of abx. On CT scan, finding concerning for perinephric abscess. 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 Thereafter, renal sono and repeat CT scan showed complete drainage of urinoma, and pigtail catheter was removed. Blood cx no growth in 48hrs, urine cx with insignificant growth, likely contaminant. Pt was seen by urology who recommended no  interventions.    Pt also found to have Normocytic Anemia, Chronic, given 1U PRBC, thereafter H/H stable. Anemia appears to be from iron def based on indicis, unable to make assessment from iron studies now due to in hospital prbc transfusion. Occult blood neg. Prior Hbg in 7-8 range. Describes normal periods with no DUB. Can proceed with o/p anemia workup since Hb now stable.   All electrolytes were carefully monitored and repleted as needed. Patient is stable and medically optimized for discharge to home. Follow up with PMD within 1 week of discharge.     time spent on discharge: 45 mins 23 y/o female with resolving severe sepsis/septic shock from right sided pyelonephritis/UTI, chronic anemia, Leukocytosis, hypophosphatemia, Hypocalcemia. Patient was admitted for Septic shock secondary to Pyelonephritis, present on admission, currently resolved. She received pressors w/ Levophed in the ICU, currently off of it. Sepsis resolved, afebrile >48 hrs, wbc trended down and now wnl. Completed Marik protocol (IV Vitamin C, Hydrocortisone, and Thiamine). Patient was given Meropenem 1g q8h x 4 days at the hospital. She will be discharged on Cipro to complete total of 2 wks of abx. On CT scan, finding concerning for perinephric abscess. 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 Thereafter, renal sono and repeat CT scan showed complete drainage of urinoma, and pigtail catheter was removed. Blood cx no growth in 48hrs, urine cx with insignificant growth, likely contaminant. Pt was seen by urology who recommended no  interventions.    Pt also found to have Normocytic Anemia, Chronic, given 1U PRBC, thereafter H/H stable. Anemia appears to be from iron def based on indicis, unable to make assessment from iron studies now due to in hospital prbc transfusion. Occult blood neg. Prior Hbg in 7-8 range. Describes normal periods with no DUB. Can proceed with o/p anemia workup since Hb now stable.   All electrolytes were carefully monitored and repleted as needed. Patient is stable and medically optimized for discharge to home. Follow up with PMD within 1 week of discharge.     Total time spent on discharge: 45 min's, more than 50 % time spent in counselling and co-ordination of patients care

## 2018-05-04 NOTE — PROGRESS NOTE ADULT - ASSESSMENT
A/P: 21 y/o female with resolving severe sepsis/septic shock from right sided pyelonephritis/UTI, chronic anemia, Leukocytosis, hypophosphatemia, Hypocalcemia.    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 diet   - PRN analgesia, Tylenol 650mg PRN  - Replace lytes, f/u repeats in AM    Normocytic Anemia, Chronic  - s/p 1U PRBC, stable  - Anemia appears to be from iron def based on indicis, unable to make assessment from iron studies now due to in hospital prbc transfusion. Occult blood neg. Prior Hbg in 7-8 range. Describes normal periods with no DUB. Can proceed with o/p anemia w/u if Hbg remains at baseline      Prophylactic Measure  - Encourage ambulation  - VCD Boots

## 2018-05-04 NOTE — PROGRESS NOTE ADULT - ATTENDING COMMENTS
I have seen and examined the patient, reviewed the chart, labs and diagnostics and I agree with assessment and plan as above with Family medicine resident Bao Henriquez     Repeat CT showed resolution of Uroma. Consulted IR  regarding removal of drain'  weaned stress dose of steroids, with last dose today   Anticipate Home today on PO Antibiotics, CIPROFLOXACIN 500 MG Q 12 for total 2 weeks and then follow up with PCP in 1 week
I have seen and examined the patient, reviewed the chart, labs and diagnostics and I agree with assessment and plan as above with Family medicine resident Bao Henriquez     Repeat USG showed resolution of Uroma. Will discuss with Urology regarding removal of drain'  wean stress dose of steroids   Anticipate Home in am on PO Antibiotics
I have seen and examined the patient, reviewed the chart, labs and diagnostics and I agree with assessment and plan as above with family medicine resident    continue Meropenem and await for final cx and sensitivities  Gave 1 L Fluid bolus and started maintainace fluids with NS, as she was slightly hypotensive this am    spoke with family at bed side

## 2018-05-04 NOTE — PROGRESS NOTE ADULT - SUBJECTIVE AND OBJECTIVE BOX
Patient is a 22y old  Female who presents with a chief complaint of Urosepsis / perinephric abscess (01 May 2018 00:38)    Patient seen and examined at bedside. No acute overnight events. Patient has no complaints this morning. She ambulating, tolerating po well, voiding and stooling without difficulties.    ROS: denies fevers, chills, nausea, vomiting, chest pain, sob, palpitations, abdominal pain, flank pain, dysuria, diarrhea.     Vital Signs Last 24 Hrs  T(C): 36.4 (04 May 2018 07:37), Max: 36.8 (03 May 2018 23:54)  T(F): 97.6 (04 May 2018 07:37), Max: 98.3 (03 May 2018 23:54)  HR: 62 (04 May 2018 07:37) (55 - 88)  BP: 117/82 (04 May 2018 07:37) (109/71 - 124/68)  RR: 17 (04 May 2018 07:37) (16 - 18)  SpO2: 96% (04 May 2018 07:37) (95% - 98%)    Physical Exam:  General: NAD, resting comfortably in bed  HEENT: NC/AT, EOMI  Lungs: CTA bilaterally, no rhonchi, no wheezes, no rhonchi  Cardio: S1S2+, RRR, no murmurs  Abdominal: soft, BS+ normoactive, non-distended, non-tender, catheter in place draining clear light yellow urine, catheter site is c/d/i, no active bleeding, no erythema around site  Extremities: no edema, no calf tenderness, DP pulses 2+ b/l  Neuro: AAOx3, no focal deficits     Labs:                                10.3   13.2  )-----------( 262      ( 03 May 2018 07:51 )             31.7   05-03    142  |  108<H>  |  10.0  ----------------------------<  123<H>  3.7   |  22.0  |  0.70    Ca    7.8<L>      03 May 2018 07:51  Phos  2.0     05-03  Mg     2.1     05-03    TPro  6.7  /  Alb  3.2<L>  /  TBili  <0.2<L>  /  DBili  x   /  AST  17  /  ALT  12  /  AlkPhos  78  05-02      MEDICATIONS  (STANDING):  ascorbic acid 500 milliGRAM(s) Oral three times a day  hydrocortisone sodium succinate Injectable 50 milliGRAM(s) IV Push every 6 hours  meropenem  IVPB      meropenem  IVPB 1000 milliGRAM(s) IV Intermittent every 8 hours  saccharomyces boulardii 250 milliGRAM(s) Oral two times a day  sodium chloride 0.9%. 1000 milliLiter(s) (100 mL/Hr) IV Continuous <Continuous>  thiamine 200 milliGRAM(s) Oral at bedtime    MEDICATIONS  (PRN):  acetaminophen   Tablet. 650 milliGRAM(s) Oral every 6 hours PRN Moderate Pain (4 - 6)

## 2018-05-05 LAB
CULTURE RESULTS: SIGNIFICANT CHANGE UP
CULTURE RESULTS: SIGNIFICANT CHANGE UP
SPECIMEN SOURCE: SIGNIFICANT CHANGE UP
SPECIMEN SOURCE: SIGNIFICANT CHANGE UP

## 2018-05-06 LAB
CULTURE RESULTS: SIGNIFICANT CHANGE UP
SPECIMEN SOURCE: SIGNIFICANT CHANGE UP

## 2018-05-23 PROCEDURE — 87205 SMEAR GRAM STAIN: CPT

## 2018-05-23 PROCEDURE — 81001 URINALYSIS AUTO W/SCOPE: CPT

## 2018-05-23 PROCEDURE — 83690 ASSAY OF LIPASE: CPT

## 2018-05-23 PROCEDURE — 36430 TRANSFUSION BLD/BLD COMPNT: CPT

## 2018-05-23 PROCEDURE — 84702 CHORIONIC GONADOTROPIN TEST: CPT

## 2018-05-23 PROCEDURE — P9016: CPT

## 2018-05-23 PROCEDURE — 87040 BLOOD CULTURE FOR BACTERIA: CPT

## 2018-05-23 PROCEDURE — T1013: CPT

## 2018-05-23 PROCEDURE — 80053 COMPREHEN METABOLIC PANEL: CPT

## 2018-05-23 PROCEDURE — 87075 CULTR BACTERIA EXCEPT BLOOD: CPT

## 2018-05-23 PROCEDURE — 96374 THER/PROPH/DIAG INJ IV PUSH: CPT

## 2018-05-23 PROCEDURE — 86900 BLOOD TYPING SEROLOGIC ABO: CPT

## 2018-05-23 PROCEDURE — 87070 CULTURE OTHR SPECIMN AEROBIC: CPT

## 2018-05-23 PROCEDURE — C1894: CPT

## 2018-05-23 PROCEDURE — 82272 OCCULT BLD FECES 1-3 TESTS: CPT

## 2018-05-23 PROCEDURE — 86923 COMPATIBILITY TEST ELECTRIC: CPT

## 2018-05-23 PROCEDURE — 83735 ASSAY OF MAGNESIUM: CPT

## 2018-05-23 PROCEDURE — 86850 RBC ANTIBODY SCREEN: CPT

## 2018-05-23 PROCEDURE — 71045 X-RAY EXAM CHEST 1 VIEW: CPT

## 2018-05-23 PROCEDURE — 36415 COLL VENOUS BLD VENIPUNCTURE: CPT

## 2018-05-23 PROCEDURE — 85027 COMPLETE CBC AUTOMATED: CPT

## 2018-05-23 PROCEDURE — 81025 URINE PREGNANCY TEST: CPT

## 2018-05-23 PROCEDURE — 84100 ASSAY OF PHOSPHORUS: CPT

## 2018-05-23 PROCEDURE — 99292 CRITICAL CARE ADDL 30 MIN: CPT | Mod: 25

## 2018-05-23 PROCEDURE — C1729: CPT

## 2018-05-23 PROCEDURE — 96375 TX/PRO/DX INJ NEW DRUG ADDON: CPT

## 2018-05-23 PROCEDURE — 82310 ASSAY OF CALCIUM: CPT

## 2018-05-23 PROCEDURE — 82306 VITAMIN D 25 HYDROXY: CPT

## 2018-05-23 PROCEDURE — 76775 US EXAM ABDO BACK WALL LIM: CPT

## 2018-05-23 PROCEDURE — 85730 THROMBOPLASTIN TIME PARTIAL: CPT

## 2018-05-23 PROCEDURE — 74176 CT ABD & PELVIS W/O CONTRAST: CPT

## 2018-05-23 PROCEDURE — 87086 URINE CULTURE/COLONY COUNT: CPT

## 2018-05-23 PROCEDURE — 83605 ASSAY OF LACTIC ACID: CPT

## 2018-05-23 PROCEDURE — 85610 PROTHROMBIN TIME: CPT

## 2018-05-23 PROCEDURE — C1769: CPT

## 2018-05-23 PROCEDURE — 86901 BLOOD TYPING SEROLOGIC RH(D): CPT

## 2018-05-23 PROCEDURE — 77012 CT SCAN FOR NEEDLE BIOPSY: CPT

## 2018-05-23 PROCEDURE — 99291 CRITICAL CARE FIRST HOUR: CPT | Mod: 25

## 2018-05-23 PROCEDURE — 83970 ASSAY OF PARATHORMONE: CPT

## 2018-05-23 PROCEDURE — 80048 BASIC METABOLIC PNL TOTAL CA: CPT

## 2019-08-26 ENCOUNTER — EMERGENCY (EMERGENCY)
Facility: HOSPITAL | Age: 24
LOS: 1 days | Discharge: HOME | End: 2019-08-26
Attending: EMERGENCY MEDICINE
Payer: SELF-PAY

## 2019-08-26 VITALS
RESPIRATION RATE: 18 BRPM | HEIGHT: 61 IN | HEART RATE: 70 BPM | SYSTOLIC BLOOD PRESSURE: 99 MMHG | WEIGHT: 160.06 LBS | OXYGEN SATURATION: 100 % | DIASTOLIC BLOOD PRESSURE: 58 MMHG | TEMPERATURE: 98 F

## 2019-08-26 LAB
ANION GAP SERPL CALC-SCNC: 9 MMOL/L — SIGNIFICANT CHANGE UP (ref 5–17)
BASOPHILS # BLD AUTO: 0.09 K/UL — SIGNIFICANT CHANGE UP (ref 0–0.2)
BASOPHILS NFR BLD AUTO: 1.8 % — SIGNIFICANT CHANGE UP (ref 0–2)
BUN SERPL-MCNC: 13 MG/DL — SIGNIFICANT CHANGE UP (ref 8–20)
CALCIUM SERPL-MCNC: 9.7 MG/DL — SIGNIFICANT CHANGE UP (ref 8.6–10.2)
CHLORIDE SERPL-SCNC: 97 MMOL/L — LOW (ref 98–107)
CO2 SERPL-SCNC: 30 MMOL/L — HIGH (ref 22–29)
CREAT SERPL-MCNC: 1.02 MG/DL — SIGNIFICANT CHANGE UP (ref 0.5–1.3)
EOSINOPHIL # BLD AUTO: 0.16 K/UL — SIGNIFICANT CHANGE UP (ref 0–0.5)
EOSINOPHIL NFR BLD AUTO: 3.1 % — SIGNIFICANT CHANGE UP (ref 0–6)
GLUCOSE SERPL-MCNC: 111 MG/DL — SIGNIFICANT CHANGE UP (ref 70–115)
HCG SERPL-ACNC: <4 MIU/ML — SIGNIFICANT CHANGE UP
HCT VFR BLD CALC: 28.8 % — LOW (ref 34.5–45)
HGB BLD-MCNC: 9.4 G/DL — LOW (ref 11.5–15.5)
IMM GRANULOCYTES NFR BLD AUTO: 0.6 % — SIGNIFICANT CHANGE UP (ref 0–1.5)
LYMPHOCYTES # BLD AUTO: 1.83 K/UL — SIGNIFICANT CHANGE UP (ref 1–3.3)
LYMPHOCYTES # BLD AUTO: 36 % — SIGNIFICANT CHANGE UP (ref 13–44)
MCHC RBC-ENTMCNC: 31.2 PG — SIGNIFICANT CHANGE UP (ref 27–34)
MCHC RBC-ENTMCNC: 32.6 GM/DL — SIGNIFICANT CHANGE UP (ref 32–36)
MCV RBC AUTO: 95.7 FL — SIGNIFICANT CHANGE UP (ref 80–100)
MONOCYTES # BLD AUTO: 0.35 K/UL — SIGNIFICANT CHANGE UP (ref 0–0.9)
MONOCYTES NFR BLD AUTO: 6.9 % — SIGNIFICANT CHANGE UP (ref 2–14)
NEUTROPHILS # BLD AUTO: 2.62 K/UL — SIGNIFICANT CHANGE UP (ref 1.8–7.4)
NEUTROPHILS NFR BLD AUTO: 51.6 % — SIGNIFICANT CHANGE UP (ref 43–77)
PLATELET # BLD AUTO: 259 K/UL — SIGNIFICANT CHANGE UP (ref 150–400)
POTASSIUM SERPL-MCNC: 3.8 MMOL/L — SIGNIFICANT CHANGE UP (ref 3.5–5.3)
POTASSIUM SERPL-SCNC: 3.8 MMOL/L — SIGNIFICANT CHANGE UP (ref 3.5–5.3)
RBC # BLD: 3.01 M/UL — LOW (ref 3.8–5.2)
RBC # FLD: 13.9 % — SIGNIFICANT CHANGE UP (ref 10.3–14.5)
SODIUM SERPL-SCNC: 136 MMOL/L — SIGNIFICANT CHANGE UP (ref 135–145)
T3 SERPL-MCNC: <40 NG/DL — LOW (ref 80–200)
T4 AB SER-ACNC: <1 UG/DL — LOW (ref 4.5–12)
TSH SERPL-MCNC: 505.2 UIU/ML — HIGH (ref 0.27–4.2)
WBC # BLD: 5.08 K/UL — SIGNIFICANT CHANGE UP (ref 3.8–10.5)
WBC # FLD AUTO: 5.08 K/UL — SIGNIFICANT CHANGE UP (ref 3.8–10.5)

## 2019-08-26 PROCEDURE — 99218: CPT

## 2019-08-26 PROCEDURE — 76536 US EXAM OF HEAD AND NECK: CPT | Mod: 26

## 2019-08-26 RX ORDER — LEVOTHYROXINE SODIUM 125 MCG
116 TABLET ORAL DAILY
Refills: 0 | Status: DISCONTINUED | OUTPATIENT
Start: 2019-08-26 | End: 2019-08-26

## 2019-08-26 RX ORDER — LEVOTHYROXINE SODIUM 125 MCG
112 TABLET ORAL DAILY
Refills: 0 | Status: DISCONTINUED | OUTPATIENT
Start: 2019-08-26 | End: 2019-08-31

## 2019-08-26 RX ADMIN — Medication 112 MICROGRAM(S): at 21:19

## 2019-08-26 NOTE — ED ADULT NURSE NOTE - OBJECTIVE STATEMENT
23 yof sent from clinic for abnormal thyroid test. pt went to clinic a couple days ago for swelling in joints. denies cp denies palpitations. stefanie

## 2019-08-26 NOTE — ED STATDOCS - ATTENDING CONTRIBUTION TO CARE
I, Nimisha Galvez, performed the initial face to face bedside interview with this patient regarding history of present illness, review of symptoms and relevant past medical, social and family history.  I completed an independent physical examination.  I was the initial provider who evaluated this patient. I have signed out the follow up of any pending tests (i.e. labs, radiological studies) to the ACP.  I have communicated the patient’s plan of care and disposition with the ACP.  The history, relevant review of systems, past medical and surgical history, medical decision making, and physical examination was documented by the scribe in my presence and I attest to the accuracy of the documentation.

## 2019-08-26 NOTE — ED STATDOCS - PROGRESS NOTE DETAILS
PT evaluated by intake physician. HPI/PE/ROS as noted above. Will follow up plan per intake physician. reviewed lab work, will obtain ultrasound thyroid, synthyroid, endo in the morning

## 2019-08-26 NOTE — ED STATDOCS - OBJECTIVE STATEMENT
24 y/o F pt with hx of anemia and pyelonephritis sent to ED by [facility] clinic for treatment of a " Thyroid Problem". No paperwork was given to her by the clinic. Her symptoms include increased fatigue and tiredness x 1 week. No fever. Is a non smoker. No ABD pain. No further complaints at this time.

## 2019-08-26 NOTE — ED ADULT NURSE NOTE - CAS DISCH TRANSFER METHOD
Private car
Pt son states pt was d/c home Monday morning w/out family knowledge and upon arriving at house, pt too weak to walk to house or up the stairs, pt normally walks w/ walker well, hospitalized for c-diff, family wants to put pt in rehab facility upon this admission, pt AOx4, resp even and unlabored, offers no complaints at this time, comfortable

## 2019-08-26 NOTE — ED ADULT NURSE NOTE - CHIEF COMPLAINT QUOTE
c/o was called from the [facility] that her thyroid was abnormal that she should go to er

## 2019-08-27 VITALS
DIASTOLIC BLOOD PRESSURE: 67 MMHG | OXYGEN SATURATION: 98 % | TEMPERATURE: 99 F | RESPIRATION RATE: 20 BRPM | HEART RATE: 67 BPM | SYSTOLIC BLOOD PRESSURE: 101 MMHG

## 2019-08-27 PROCEDURE — T1013: CPT

## 2019-08-27 PROCEDURE — 99284 EMERGENCY DEPT VISIT MOD MDM: CPT

## 2019-08-27 PROCEDURE — 99223 1ST HOSP IP/OBS HIGH 75: CPT

## 2019-08-27 PROCEDURE — 84480 ASSAY TRIIODOTHYRONINE (T3): CPT

## 2019-08-27 PROCEDURE — 84443 ASSAY THYROID STIM HORMONE: CPT

## 2019-08-27 PROCEDURE — 84436 ASSAY OF TOTAL THYROXINE: CPT

## 2019-08-27 PROCEDURE — 84702 CHORIONIC GONADOTROPIN TEST: CPT

## 2019-08-27 PROCEDURE — 99217: CPT

## 2019-08-27 PROCEDURE — 36415 COLL VENOUS BLD VENIPUNCTURE: CPT

## 2019-08-27 PROCEDURE — 80048 BASIC METABOLIC PNL TOTAL CA: CPT

## 2019-08-27 PROCEDURE — G0378: CPT

## 2019-08-27 PROCEDURE — 76536 US EXAM OF HEAD AND NECK: CPT

## 2019-08-27 PROCEDURE — 85027 COMPLETE CBC AUTOMATED: CPT

## 2019-08-27 RX ORDER — LEVOTHYROXINE SODIUM 125 MCG
1 TABLET ORAL
Qty: 30 | Refills: 0
Start: 2019-08-27 | End: 2019-09-25

## 2019-08-27 RX ADMIN — Medication 112 MICROGRAM(S): at 05:23

## 2019-08-27 NOTE — ED CDU PROVIDER DISPOSITION NOTE - PATIENT PORTAL LINK FT
You can access the FollowMyHealth Patient Portal offered by [facility] by registering at the following website: [email or web address]. By joining [employer]’s FollowMyHealth portal, you will also be able to view your health information using other applications (apps) compatible with our system.

## 2019-08-27 NOTE — ED CDU PROVIDER INITIAL DAY NOTE - ATTENDING CONTRIBUTION TO CARE
well appearing young female, NAD; sent in by [facility], multiple vague complaints including weight changes and temperature fluctuations (hot flasshes/chills); tsh noted to be signficantly elevated; normal vitals; normothermia; labs normal; awaiting endo recs

## 2019-08-27 NOTE — PROVIDER CONTACT NOTE (OTHER) - ASSESSMENT
With the assist of Lebanese/ English interpretor , Colt Alanis, discharge planning discussed with patient today. Patient lives with her supportive brother Lalito. Patient was fully independent and working full time prior to hospital admission. Patient states that she fills her medications at [facility] in [place], [place] ( [facility] ) Patient states that her brother is able to assist her with the cost of her medications as needed. Patient receives her medical care at the [facility]. Patient has a prescheduled appointment with Dr Gonsales at the [facility] on September 3, 2019. Patient was also seen by Endocrinology while in the hospital. Discharge instructions will guide patient as to when her follow up visit with Endocrinology is needed.

## 2019-08-27 NOTE — CONSULT NOTE ADULT - ASSESSMENT
Primary hypothyroidism, severe, but without evidence of myxedema or life threatening manifestations of hypothyroidism.    Discussed importance of lifelong therapy with thyroid hormone and need for outpatient follow up, as well as risk of miscarriage should pregnancy occur while untreated.    Agree with oral T4 and arrangements being made for follow up.

## 2019-08-27 NOTE — ED ADULT NURSE REASSESSMENT NOTE - NS ED NURSE REASSESS COMMENT FT1
Pt alert and oriented. resting in stretcher, no signs of distress noted. Handoff report given to Kiana Kimble RN. pt's safety maintained. RN with no questions or concerns at this time.
Purposeful rounding completed on patient. pt resting in stretcher with no complaints of chest pain, SOB or dizziness. VSS and documented as per flow sheet.  plan care of care reviewed. Bed in lowest position, call bell within reach, and safety maintained. monitoring on-going for any changes.
resting in stretcher with no complaints of pain or discomfort. remains hemodynamically stable. safety maintained. Will continue to monitor.
assumed pt care from previous Rn Gerald Franco .  pt transported to CDU-6 for observation. pt predominately Cameroonian speaking. ED  offered. pt declines services, requesting brother to translate at this time. A&Ox4;  resting in stretcher, with no complaints of pain or discomfort. awaiting US results and endo consult.  VSS and documented as per flow sheet. plan of care reviewed and pt verbalizes understanding. bed in lowest position, call bell within reach and safety maintained. monitoring ongoing for any changes.
Assumed care of the patient at 0730. Patient A&Ox3. No s/s of distress or pain. abdomen soft and nondistended. Patient ambulatory. Patient awaiting endocrinology consult. PIV patent. Patient with no further questions for the nurse. Patient in understanding of plan of care. Bed in lowest position. Call bell within reach and encouraged to use when assistance needed. Will continue to monitor.

## 2019-08-27 NOTE — ED CDU PROVIDER DISPOSITION NOTE - CLINICAL COURSE
22 y/o F sent by [facility] with elevated TSH.  Found to be severely hypothyroid, started on synthroid 112mcg.  Seen by dr. duffy of endocrinology, he agrees with dose patient stared on, and recommends [facility] f/u.  Has an appointment there on 9/3.

## 2019-08-27 NOTE — ED CDU PROVIDER INITIAL DAY NOTE - OBJECTIVE STATEMENT
24 y/o F pt with hx of anemia and pyelonephritis sent to ED by [facility] clinic for treatment of a " Thyroid Problem". No paperwork was given to her by the clinic. Her symptoms include increased fatigue and tiredness x 1 week. No fever. Is a non smoker. No ABD pain. No further complaints at this time.

## 2019-08-27 NOTE — CONSULT NOTE ADULT - SUBJECTIVE AND OBJECTIVE BOX
HPI:Pt. sent to ER due to abnormal thyroid function tests. History obtained through   Found hypothyroid 10 years ago (? during pregnancy) and temporarily on therapy. Two children, ages 2 and 10, no current plans for pregnancy. c/o progressive swellling, fatigue, cold intolerance, muscle cramps, and menorrhagia  Speech has been slow.       MEDICATIONS  (STANDING):  levothyroxine 112 MICROGram(s) Oral daily - started in ER    MEDICATIONS  (PRN):      Allergies    No Known Allergies    Intolerances          PHYSICAL EXAM:    Vital Signs Last 24 Hrs  T(C): 37.2 (27 Aug 2019 14:12), Max: 37.2 (27 Aug 2019 14:12)  T(F): 99 (27 Aug 2019 14:12), Max: 99 (27 Aug 2019 14:12)  HR: 67 (27 Aug 2019 14:12) (56 - 74)  BP: 101/67 (27 Aug 2019 14:12) (87/51 - 102/67)  BP(mean): 79 (26 Aug 2019 21:58) (79 - 79)  RR: 20 (27 Aug 2019 14:12) (16 - 20)  SpO2: 98% (27 Aug 2019 14:12) (96% - 100%)    General appearance: Well developed, well nourished.    Eyes: Pupils equal. No exophthalmos. Facial and periorbital edema    Neck: Trachea midline. Thyroid palpable    Lungs: Normal respiratory excursion. Lungs clear.    CV: Regular cardiac rhythm. No murmur.     Musculoskeletal: No cyanosis, clubbing, or edema.     Skin: Warm and dry    Neuro: Cranial nerves intact. Normal motor function. Alert and responsive    Psych: Normal affect, good judgement.            LABS:                        9.4    5.08  )-----------( 259      ( 26 Aug 2019 16:53 )             28.8     08-26    136  |  97<L>  |  13.0  ----------------------------<  111  3.8   |  30.0<H>  |  1.02    Ca    9.7      26 Aug 2019 16:53            T4, Serum: <1.0 ug/dL (08-26 @ 16:53) TSH >500

## 2019-08-27 NOTE — ED ADULT NURSE REASSESSMENT NOTE - GENERAL PATIENT STATE
comfortable appearance
family/SO at bedside/comfortable appearance/cooperative/resting/sleeping/smiling/interactive

## 2020-01-27 NOTE — PATIENT PROFILE OB - WEIGHT PRE-PREGNANCY IN KG
[facility]  Discharge Note  Short Stay    Admit Date: 1/27/2020    Discharge Date: 1/27/2020    Attending Physician: Josafat Kahn MD     Discharge Provider: Josafat Kahn    Diagnoses:  Active Hospital Problems    Diagnosis  POA    *Lumbar radiculopathy [M54.16]  Yes      Resolved Hospital Problems   No resolved problems to display.       Discharged Condition: good    Final Diagnoses: Lumbar radiculopathy [M54.16]    Disposition: Home or Self Care    Hospital Course: no complications, uneventful    Outcome of Hospitalization, Treatment, Procedure, or Surgery:  Patient was admitted for outpatient procedure. The patient underwent procedure without complications and are discharged home    Follow up/Patient Instructions:  Follow up as scheduled in Pain Management clinic in 3-4 weeks/Patient has received instructions and follow up date and time    Medications:  Continue previous medications    Discharge Procedure Orders   Call MD for:  temperature >100.4     Call MD for:  severe uncontrolled pain     Call MD for:  redness, tenderness, or signs of infection (pain, swelling, redness, odor or green/yellow discharge around incision site)     Call MD for:  severe persistent headache     No dressing needed         Discharge Procedure Orders (must include Diet, Follow-up, Activity):   Discharge Procedure Orders (must include Diet, Follow-up, Activity)   Call MD for:  temperature >100.4     Call MD for:  severe uncontrolled pain     Call MD for:  redness, tenderness, or signs of infection (pain, swelling, redness, odor or green/yellow discharge around incision site)     Call MD for:  severe persistent headache     No dressing needed          
61

## 2021-02-19 ENCOUNTER — EMERGENCY (EMERGENCY)
Facility: HOSPITAL | Age: 26
LOS: 1 days | Discharge: HOME | End: 2021-02-19
Attending: EMERGENCY MEDICINE
Payer: MEDICAID

## 2021-02-19 VITALS
HEART RATE: 86 BPM | WEIGHT: 162.92 LBS | TEMPERATURE: 97 F | OXYGEN SATURATION: 99 % | RESPIRATION RATE: 16 BRPM | DIASTOLIC BLOOD PRESSURE: 60 MMHG | SYSTOLIC BLOOD PRESSURE: 109 MMHG | HEIGHT: 61 IN

## 2021-02-19 LAB
ALBUMIN SERPL ELPH-MCNC: 4.5 G/DL — SIGNIFICANT CHANGE UP (ref 3.3–5.2)
ALP SERPL-CCNC: 111 U/L — SIGNIFICANT CHANGE UP (ref 40–120)
ALT FLD-CCNC: 15 U/L — SIGNIFICANT CHANGE UP
ANION GAP SERPL CALC-SCNC: 14 MMOL/L — SIGNIFICANT CHANGE UP (ref 5–17)
APPEARANCE UR: CLEAR — SIGNIFICANT CHANGE UP
AST SERPL-CCNC: 20 U/L — SIGNIFICANT CHANGE UP
BACTERIA # UR AUTO: ABNORMAL
BASOPHILS # BLD AUTO: 0.1 K/UL — SIGNIFICANT CHANGE UP (ref 0–0.2)
BASOPHILS NFR BLD AUTO: 0.7 % — SIGNIFICANT CHANGE UP (ref 0–2)
BILIRUB SERPL-MCNC: <0.2 MG/DL — LOW (ref 0.4–2)
BILIRUB UR-MCNC: NEGATIVE — SIGNIFICANT CHANGE UP
BUN SERPL-MCNC: 10 MG/DL — SIGNIFICANT CHANGE UP (ref 8–20)
CALCIUM SERPL-MCNC: 9.4 MG/DL — SIGNIFICANT CHANGE UP (ref 8.6–10.2)
CHLORIDE SERPL-SCNC: 100 MMOL/L — SIGNIFICANT CHANGE UP (ref 98–107)
CO2 SERPL-SCNC: 20 MMOL/L — LOW (ref 22–29)
COLOR SPEC: YELLOW — SIGNIFICANT CHANGE UP
CREAT SERPL-MCNC: 0.56 MG/DL — SIGNIFICANT CHANGE UP (ref 0.5–1.3)
DIFF PNL FLD: ABNORMAL
EOSINOPHIL # BLD AUTO: 0.1 K/UL — SIGNIFICANT CHANGE UP (ref 0–0.5)
EOSINOPHIL NFR BLD AUTO: 0.7 % — SIGNIFICANT CHANGE UP (ref 0–6)
EPI CELLS # UR: SIGNIFICANT CHANGE UP
GLUCOSE SERPL-MCNC: 85 MG/DL — SIGNIFICANT CHANGE UP (ref 70–99)
GLUCOSE UR QL: NEGATIVE MG/DL — SIGNIFICANT CHANGE UP
HCG SERPL-ACNC: HIGH MIU/ML
HCG UR QL: POSITIVE
HCT VFR BLD CALC: 33.9 % — LOW (ref 34.5–45)
HGB BLD-MCNC: 10.7 G/DL — LOW (ref 11.5–15.5)
IMM GRANULOCYTES NFR BLD AUTO: 0.4 % — SIGNIFICANT CHANGE UP (ref 0–1.5)
KETONES UR-MCNC: NEGATIVE — SIGNIFICANT CHANGE UP
LEUKOCYTE ESTERASE UR-ACNC: ABNORMAL
LYMPHOCYTES # BLD AUTO: 1.79 K/UL — SIGNIFICANT CHANGE UP (ref 1–3.3)
LYMPHOCYTES # BLD AUTO: 12.2 % — LOW (ref 13–44)
MCHC RBC-ENTMCNC: 24.6 PG — LOW (ref 27–34)
MCHC RBC-ENTMCNC: 31.6 GM/DL — LOW (ref 32–36)
MCV RBC AUTO: 77.9 FL — LOW (ref 80–100)
MONOCYTES # BLD AUTO: 1.02 K/UL — HIGH (ref 0–0.9)
MONOCYTES NFR BLD AUTO: 7 % — SIGNIFICANT CHANGE UP (ref 2–14)
NEUTROPHILS # BLD AUTO: 11.56 K/UL — HIGH (ref 1.8–7.4)
NEUTROPHILS NFR BLD AUTO: 79 % — HIGH (ref 43–77)
NITRITE UR-MCNC: NEGATIVE — SIGNIFICANT CHANGE UP
PH UR: 7 — SIGNIFICANT CHANGE UP (ref 5–8)
PLATELET # BLD AUTO: 352 K/UL — SIGNIFICANT CHANGE UP (ref 150–400)
POTASSIUM SERPL-MCNC: 3.7 MMOL/L — SIGNIFICANT CHANGE UP (ref 3.5–5.3)
POTASSIUM SERPL-SCNC: 3.7 MMOL/L — SIGNIFICANT CHANGE UP (ref 3.5–5.3)
PROT SERPL-MCNC: 8.2 G/DL — SIGNIFICANT CHANGE UP (ref 6.6–8.7)
PROT UR-MCNC: 30 MG/DL
RBC # BLD: 4.35 M/UL — SIGNIFICANT CHANGE UP (ref 3.8–5.2)
RBC # FLD: 15.6 % — HIGH (ref 10.3–14.5)
RBC CASTS # UR COMP ASSIST: ABNORMAL /HPF (ref 0–4)
SODIUM SERPL-SCNC: 133 MMOL/L — LOW (ref 135–145)
SP GR SPEC: 1.01 — SIGNIFICANT CHANGE UP (ref 1.01–1.02)
UROBILINOGEN FLD QL: NEGATIVE MG/DL — SIGNIFICANT CHANGE UP
WBC # BLD: 14.63 K/UL — HIGH (ref 3.8–10.5)
WBC # FLD AUTO: 14.63 K/UL — HIGH (ref 3.8–10.5)
WBC UR QL: ABNORMAL

## 2021-02-19 PROCEDURE — 85025 COMPLETE CBC W/AUTO DIFF WBC: CPT

## 2021-02-19 PROCEDURE — 36415 COLL VENOUS BLD VENIPUNCTURE: CPT

## 2021-02-19 PROCEDURE — 87086 URINE CULTURE/COLONY COUNT: CPT

## 2021-02-19 PROCEDURE — 84702 CHORIONIC GONADOTROPIN TEST: CPT

## 2021-02-19 PROCEDURE — 87186 SC STD MICRODIL/AGAR DIL: CPT

## 2021-02-19 PROCEDURE — 76801 OB US < 14 WKS SINGLE FETUS: CPT | Mod: 26

## 2021-02-19 PROCEDURE — 76801 OB US < 14 WKS SINGLE FETUS: CPT

## 2021-02-19 PROCEDURE — 81001 URINALYSIS AUTO W/SCOPE: CPT

## 2021-02-19 PROCEDURE — 80053 COMPREHEN METABOLIC PANEL: CPT

## 2021-02-19 PROCEDURE — 99285 EMERGENCY DEPT VISIT HI MDM: CPT

## 2021-02-19 PROCEDURE — 81025 URINE PREGNANCY TEST: CPT

## 2021-02-19 PROCEDURE — 99284 EMERGENCY DEPT VISIT MOD MDM: CPT | Mod: 25

## 2021-02-19 RX ORDER — CEPHALEXIN 500 MG
500 CAPSULE ORAL ONCE
Refills: 0 | Status: COMPLETED | OUTPATIENT
Start: 2021-02-19 | End: 2021-02-19

## 2021-02-19 RX ORDER — CEPHALEXIN 500 MG
1 CAPSULE ORAL
Qty: 21 | Refills: 0
Start: 2021-02-19 | End: 2021-02-25

## 2021-02-19 RX ADMIN — Medication 500 MILLIGRAM(S): at 13:29

## 2021-02-19 NOTE — ED STATDOCS - PROGRESS NOTE DETAILS
pt well appearing labs and imaging reviewed and discussed with pt. Pt is 7 weeks 2 days and has not established prenatal care. Will give [place] clinic and start her on prenatals. PT UA + UTI keflex given will rx more for home. Strict ED return precautions discussed with pt and pt verbalized understanding. Omer used for

## 2021-02-19 NOTE — ED STATDOCS - OBJECTIVE STATEMENT
26 y/o female with PMHx of , appendectomy, pyelonephritis, hypothyroid, presents to the ED c/o lower abdominal pain that radiates to her lower back. Pt states she is 1 month late on her menstrual period. Pt notes possible pregnancy but denies taking pregnancy test. Denies burning with urination. Last pregnancy was 3 years ago. 26 y/o female with PMHx of , appendectomy, pyelonephritis during first pregnancy, hypothyroid, presents to the ED c/o lower abdominal pain that radiates to her lower back. Pt states she is 1 month late on her menstrual period. Pt notes possible pregnancy but denies taking pregnancy test. Denies burning with urination. Last pregnancy was 3 years ago.

## 2021-02-19 NOTE — ED ADULT TRIAGE NOTE - PATIENT/CAREGIVER ACCEPTED INTERPRETER SERVICES
Anesthesia ROS/Med Hx        Pulmonary Review:    Pt. positive for COPD     Cardiovascular Review:    Exercise tolerance: good  Pt. positive for hypertension  Pt. positive for PVD    GI/HEPATIC/RENAL Review:    Pt. positive for GERD      Anesthesia Plan     ASA Status: 3  Anesthesia Type: MAC  Induction: Intravenous  Maintenance: TIVA  Reviewed: Pre-Induction Reassessment, Patient Summary, Beta Blocker Status, NPO Status, Allergies, Medications, Problem List and Past Med History  The proposed anesthetic plan, including its risks and benefits, have been discussed with the Patient - along with the risks and benefits of alternatives.  Questions were encouraged and answered and the patient and/or representative agrees to proceed.  Blood Products: Not Anticipated      Physical Exam  Mallampati: II  cardiovascular exam normal                  
yes

## 2021-02-19 NOTE — ED STATDOCS - CONDITION AT DISCHARGE:
TM called  back;  spoke with TM to complete initial assessment; TM had exposure 11/12 from PT student who had lunch together, unmasked, closer than 6 feet, longer than 15 mins; TM asymptomatic at this time; TM remains WORKING at this time; the time frame has passed to complete the serial antigen testing; TM will self monitor symptoms and notify  if symptoms develop   Satisfactory

## 2021-02-19 NOTE — ED STATDOCS - NSFOLLOWUPINSTRUCTIONS_ED_ALL_ED_FT
Urinary Tract Infection    A urinary tract infection (UTI) is an infection of any part of the urinary tract, which includes the kidneys, ureters, bladder, and urethra. Risk factors include ignoring your need to urinate, wiping back to front if female, being an uncircumcised male, and having diabetes or a weak immune system. Symptoms include frequent urination, pain or burning with urination, foul smelling urine, cloudy urine, pain in the lower abdomen, blood in the urine, and fever. If you were prescribed an antibiotic medicine, take it as told by your health care provider. Do not stop taking the antibiotic even if you start to feel better.    SEEK IMMEDIATE MEDICAL CARE IF YOU HAVE ANY OF THE FOLLOWING SYMPTOMS: severe back or abdominal pain, fever, inability to keep fluids or medicine down, dizziness/lightheadedness, or a change in mental status.     Take Keflex (cephalexin) 500 mg 3 x a day for 7 days.    Take prenatals once a day. Have OBGYN prescribe your next month and change it if it does not agree with you.     Follow up with [facility] ([facility]) next week for your prenatal care.     Return to ED with any new concerns or worsening symptoms    You are advised to please follow up with your primary care doctor within the next 24-48 hours and return to the Emergency Department for worsening symptoms or any other concerns.    Infección del tracto urinario    Cb infección del tracto urinario (INFECCIÓN URINARIA) es cb infección de cualquier parte de las vías urinarias, que incluye los riñones, uréteres, vejiga y uretra. Los factores de riesgo incluyen ignorar la necesidad de orinar, limpiar de nuevo al frente si es vanita, ser un macho no circuncidado y tener diabetes o un sistema inmunitario débil. Los síntomas incluyen micción frecuente, dolor o ardor con orina, orina con olor fétido, orina turbia, dolor en la parte inferior del abdomen, yue en la orina y fiebre. Si le recetaron un medicamento antibiótico, tómelo según lo indique orosco proveedor de atención médica. No deje de pernell el antibiótico incluso si comienza a sentirse mejor.    BUSQUE ATENCIÓN MÉDICA INMEDIATA SI TIENE ALGUNO DE LOS SIGUIENTES SÍNTOMAS: dolor de espalda o abdominal intenso, fiebre, incapacidad para mantener los líquidos o medicamentos apagados, mareos/mareos o un cambio en el estado mental.     [place] Keflex (cefaloxina) 500 mg 3 x al día karina 7 días.    [place] prenatales cb vez al día. Pide a OBGYN que prescriba tu próximo mes y cámbialo si no está de acuerdo contigo.     Lisa un seguimiento con la clínica [place] (clínica [facility]) la próxima semana para orosco atención prenatal.     Volver a la ED con nuevas preocupaciones o síntomas que empeoren    Se le recomienda que lisa un seguimiento con orosco médico de atención primaria en las próximas 24-48 horas y regrese al Departamento de Emergencias para empeorar los síntomas o cualquier otra inquietud.

## 2021-02-19 NOTE — ED ADULT TRIAGE NOTE - CHIEF COMPLAINT QUOTE
lower abdominal pain radiating to her back x1 week worsening.  denies n/v/d.  pt states she is one month late on menstrual period.

## 2021-02-19 NOTE — ED STATDOCS - PATIENT PORTAL LINK FT
You can access the FollowMyHealth Patient Portal offered by [facility] by registering at the following website: [email or web address]. By joining [employer]’s FollowMyHealth portal, you will also be able to view your health information using other applications (apps) compatible with our system.

## 2022-03-01 NOTE — ED ADULT NURSE NOTE - IN THE PAST 12 MONTHS HAVE YOU USED DRUGS OTHER THAN THOSE REQUIRED FOR MEDICAL REASON?
General: no fever  Head: no headache  Eyes: no vision change  ENT: no nasal discharge/congestion, no sore throat  CV: no chest pain  Resp: no SOB, no cough  GI: +nausea, no vomiting or diarrhea, +mild epigastric pain  : no dysuria  MSK: no joint pain  Skin: no new rash  Neuro: no focal weakness, no change in sensation  Psych: +SI, no HI, no AH/VH No

## 2022-03-22 ENCOUNTER — TRANSCRIPTION ENCOUNTER (OUTPATIENT)
Age: 27
End: 2022-03-22

## 2023-07-07 NOTE — BRIEF OPERATIVE NOTE - CONDITION POST OP
Connected Care Resource Center    Background: Transitional Care Management program identified per system criteria and reviewed by [facility] team for possible outreach.    Assessment: Upon chart review, [facility] Team member will not proceed with patient outreach related to this episode of Transitional Care Management program due to reason below:    Patient has discharged to a Memory Care, Long-term Care, Assisted Living or Group Home where patient is receiving on-site support with their daily cares, including support with hospital follow up plan.    Plan: Transitional Care Management episode addressed appropriately per reason noted above.      Leigh Leyva MA  Connected Care Resource [place], [facility]    *Connected Care Resource Team does NOT follow patient ongoing. Referrals are identified based on internal discharge reports and the outreach is to ensure patient has an understanding of their discharge instructions.   stable

## 2023-11-04 NOTE — ED STATDOCS - NS_EDPROVIDERDISPOUSERTYPE_ED_A_ED
15 Scribe Attestation (For Scribes USE Only)... Scribe Attestation (For Scribes USE Only).../Attending Attestation (For Attendings USE Only)...

## 2024-12-20 NOTE — DISCHARGE NOTE OB - USE THE FOOD PYRAMID (LOCATED IN DISCHARGE MATERIALS PROVIDED) AS A GUIDE TO BALANCE AND MODERATION
Addended by: RUI PUENTES on: 12/20/2024 03:40 PM     Modules accepted: Orders     Statement Selected